# Patient Record
Sex: FEMALE | Race: BLACK OR AFRICAN AMERICAN | Employment: FULL TIME | URBAN - METROPOLITAN AREA
[De-identification: names, ages, dates, MRNs, and addresses within clinical notes are randomized per-mention and may not be internally consistent; named-entity substitution may affect disease eponyms.]

---

## 2020-03-12 ENCOUNTER — APPOINTMENT (OUTPATIENT)
Dept: CT IMAGING | Age: 35
End: 2020-03-12
Attending: EMERGENCY MEDICINE
Payer: COMMERCIAL

## 2020-03-12 ENCOUNTER — APPOINTMENT (OUTPATIENT)
Dept: GENERAL RADIOLOGY | Age: 35
End: 2020-03-12
Attending: EMERGENCY MEDICINE
Payer: COMMERCIAL

## 2020-03-12 ENCOUNTER — APPOINTMENT (OUTPATIENT)
Dept: ULTRASOUND IMAGING | Age: 35
End: 2020-03-12
Attending: EMERGENCY MEDICINE
Payer: COMMERCIAL

## 2020-03-12 ENCOUNTER — HOSPITAL ENCOUNTER (EMERGENCY)
Age: 35
Discharge: HOME | End: 2020-03-12
Attending: EMERGENCY MEDICINE
Payer: COMMERCIAL

## 2020-03-12 VITALS
WEIGHT: 190.92 LBS | DIASTOLIC BLOOD PRESSURE: 58 MMHG | HEART RATE: 80 BPM | BODY MASS INDEX: 32.77 KG/M2 | SYSTOLIC BLOOD PRESSURE: 107 MMHG | OXYGEN SATURATION: 100 % | TEMPERATURE: 98.9 F | RESPIRATION RATE: 16 BRPM

## 2020-03-12 DIAGNOSIS — K80.20 GALLSTONES: ICD-10-CM

## 2020-03-12 DIAGNOSIS — R74.8 ELEVATED LIVER ENZYMES: Primary | ICD-10-CM

## 2020-03-12 DIAGNOSIS — R10.11 RUQ PAIN: ICD-10-CM

## 2020-03-12 LAB
ALBUMIN SERPL-MCNC: 3.3 G/DL (ref 3.5–5)
ALBUMIN/GLOB SERPL: 0.9 {RATIO} (ref 1.1–2.2)
ALP SERPL-CCNC: 143 U/L (ref 45–117)
ALT SERPL-CCNC: 264 U/L (ref 12–78)
ANION GAP SERPL CALC-SCNC: 8 MMOL/L (ref 5–15)
APPEARANCE UR: ABNORMAL
AST SERPL-CCNC: 364 U/L (ref 15–37)
ATRIAL RATE: 69 BPM
BACTERIA URNS QL MICRO: ABNORMAL /HPF
BASOPHILS # BLD: 0.1 K/UL (ref 0–0.1)
BASOPHILS NFR BLD: 1 % (ref 0–1)
BILIRUB SERPL-MCNC: 1.1 MG/DL (ref 0.2–1)
BILIRUB UR QL: NEGATIVE
BUN SERPL-MCNC: 12 MG/DL (ref 6–20)
BUN/CREAT SERPL: 17 (ref 12–20)
CALCIUM SERPL-MCNC: 9 MG/DL (ref 8.5–10.1)
CALCULATED P AXIS, ECG09: 35 DEGREES
CALCULATED R AXIS, ECG10: 44 DEGREES
CALCULATED T AXIS, ECG11: 24 DEGREES
CHLORIDE SERPL-SCNC: 102 MMOL/L (ref 97–108)
CO2 SERPL-SCNC: 28 MMOL/L (ref 21–32)
COLOR UR: ABNORMAL
CREAT SERPL-MCNC: 0.69 MG/DL (ref 0.55–1.02)
DIAGNOSIS, 93000: NORMAL
DIFFERENTIAL METHOD BLD: ABNORMAL
EOSINOPHIL # BLD: 0.3 K/UL (ref 0–0.4)
EOSINOPHIL NFR BLD: 5 % (ref 0–7)
EPITH CASTS URNS QL MICRO: ABNORMAL /LPF
ERYTHROCYTE [DISTWIDTH] IN BLOOD BY AUTOMATED COUNT: 14.9 % (ref 11.5–14.5)
GLOBULIN SER CALC-MCNC: 3.7 G/DL (ref 2–4)
GLUCOSE SERPL-MCNC: 92 MG/DL (ref 65–100)
GLUCOSE UR STRIP.AUTO-MCNC: NEGATIVE MG/DL
HCG UR QL: NEGATIVE
HCT VFR BLD AUTO: 37.7 % (ref 35–47)
HGB BLD-MCNC: 12.3 G/DL (ref 11.5–16)
HGB UR QL STRIP: NEGATIVE
IMM GRANULOCYTES # BLD AUTO: 0 K/UL (ref 0–0.04)
IMM GRANULOCYTES NFR BLD AUTO: 0 % (ref 0–0.5)
KETONES UR QL STRIP.AUTO: NEGATIVE MG/DL
LEUKOCYTE ESTERASE UR QL STRIP.AUTO: NEGATIVE
LIPASE SERPL-CCNC: 108 U/L (ref 73–393)
LYMPHOCYTES # BLD: 1.6 K/UL (ref 0.8–3.5)
LYMPHOCYTES NFR BLD: 24 % (ref 12–49)
MCH RBC QN AUTO: 28.8 PG (ref 26–34)
MCHC RBC AUTO-ENTMCNC: 32.6 G/DL (ref 30–36.5)
MCV RBC AUTO: 88.3 FL (ref 80–99)
MONOCYTES # BLD: 0.6 K/UL (ref 0–1)
MONOCYTES NFR BLD: 9 % (ref 5–13)
NEUTS SEG # BLD: 4.3 K/UL (ref 1.8–8)
NEUTS SEG NFR BLD: 61 % (ref 32–75)
NITRITE UR QL STRIP.AUTO: NEGATIVE
NRBC # BLD: 0 K/UL (ref 0–0.01)
NRBC BLD-RTO: 0 PER 100 WBC
P-R INTERVAL, ECG05: 170 MS
PH UR STRIP: 7 [PH] (ref 5–8)
PLATELET # BLD AUTO: 354 K/UL (ref 150–400)
PMV BLD AUTO: 9.6 FL (ref 8.9–12.9)
POTASSIUM SERPL-SCNC: 4.4 MMOL/L (ref 3.5–5.1)
PROT SERPL-MCNC: 7 G/DL (ref 6.4–8.2)
PROT UR STRIP-MCNC: NEGATIVE MG/DL
Q-T INTERVAL, ECG07: 394 MS
QRS DURATION, ECG06: 82 MS
QTC CALCULATION (BEZET), ECG08: 422 MS
RBC # BLD AUTO: 4.27 M/UL (ref 3.8–5.2)
RBC #/AREA URNS HPF: ABNORMAL /HPF (ref 0–5)
SODIUM SERPL-SCNC: 138 MMOL/L (ref 136–145)
SP GR UR REFRACTOMETRY: 1.01 (ref 1–1.03)
TROPONIN I SERPL-MCNC: <0.05 NG/ML
UR CULT HOLD, URHOLD: NORMAL
UROBILINOGEN UR QL STRIP.AUTO: 0.2 EU/DL (ref 0.2–1)
VENTRICULAR RATE, ECG03: 69 BPM
WBC # BLD AUTO: 6.9 K/UL (ref 3.6–11)
WBC URNS QL MICRO: ABNORMAL /HPF (ref 0–4)

## 2020-03-12 PROCEDURE — 83690 ASSAY OF LIPASE: CPT

## 2020-03-12 PROCEDURE — 99283 EMERGENCY DEPT VISIT LOW MDM: CPT

## 2020-03-12 PROCEDURE — 74177 CT ABD & PELVIS W/CONTRAST: CPT

## 2020-03-12 PROCEDURE — 85025 COMPLETE CBC W/AUTO DIFF WBC: CPT

## 2020-03-12 PROCEDURE — 74011250636 HC RX REV CODE- 250/636: Performed by: EMERGENCY MEDICINE

## 2020-03-12 PROCEDURE — 84484 ASSAY OF TROPONIN QUANT: CPT

## 2020-03-12 PROCEDURE — 76705 ECHO EXAM OF ABDOMEN: CPT

## 2020-03-12 PROCEDURE — 81025 URINE PREGNANCY TEST: CPT

## 2020-03-12 PROCEDURE — 93005 ELECTROCARDIOGRAM TRACING: CPT

## 2020-03-12 PROCEDURE — 96375 TX/PRO/DX INJ NEW DRUG ADDON: CPT

## 2020-03-12 PROCEDURE — 36415 COLL VENOUS BLD VENIPUNCTURE: CPT

## 2020-03-12 PROCEDURE — 74011636320 HC RX REV CODE- 636/320: Performed by: EMERGENCY MEDICINE

## 2020-03-12 PROCEDURE — 80053 COMPREHEN METABOLIC PANEL: CPT

## 2020-03-12 PROCEDURE — 71046 X-RAY EXAM CHEST 2 VIEWS: CPT

## 2020-03-12 PROCEDURE — 81001 URINALYSIS AUTO W/SCOPE: CPT

## 2020-03-12 PROCEDURE — 96374 THER/PROPH/DIAG INJ IV PUSH: CPT

## 2020-03-12 RX ORDER — OMEPRAZOLE 20 MG/1
20 TABLET, DELAYED RELEASE ORAL DAILY
Qty: 30 TAB | Refills: 0 | Status: SHIPPED | OUTPATIENT
Start: 2020-03-12 | End: 2021-04-10

## 2020-03-12 RX ORDER — MULTIVITAMIN WITH IRON
1 TABLET ORAL DAILY
COMMUNITY

## 2020-03-12 RX ORDER — CETIRIZINE HYDROCHLORIDE 10 MG/1
CAPSULE, LIQUID FILLED ORAL
COMMUNITY
End: 2021-04-06

## 2020-03-12 RX ORDER — FEXOFENADINE HCL AND PSEUDOEPHEDRINE HCI 60; 120 MG/1; MG/1
1 TABLET, EXTENDED RELEASE ORAL EVERY 12 HOURS
COMMUNITY
End: 2021-04-06

## 2020-03-12 RX ORDER — MORPHINE SULFATE 2 MG/ML
4 INJECTION, SOLUTION INTRAMUSCULAR; INTRAVENOUS
Status: COMPLETED | OUTPATIENT
Start: 2020-03-12 | End: 2020-03-12

## 2020-03-12 RX ORDER — ONDANSETRON 2 MG/ML
4 INJECTION INTRAMUSCULAR; INTRAVENOUS
Status: COMPLETED | OUTPATIENT
Start: 2020-03-12 | End: 2020-03-12

## 2020-03-12 RX ORDER — DICYCLOMINE HYDROCHLORIDE 10 MG/1
20 CAPSULE ORAL
Qty: 20 CAP | Refills: 0 | Status: SHIPPED | OUTPATIENT
Start: 2020-03-12 | End: 2021-04-10

## 2020-03-12 RX ADMIN — IOPAMIDOL 100 ML: 755 INJECTION, SOLUTION INTRAVENOUS at 18:02

## 2020-03-12 RX ADMIN — MORPHINE SULFATE 4 MG: 2 INJECTION, SOLUTION INTRAMUSCULAR; INTRAVENOUS at 18:14

## 2020-03-12 RX ADMIN — IOHEXOL: 240 INJECTION, SOLUTION INTRATHECAL; INTRAVASCULAR; INTRAVENOUS; ORAL at 15:43

## 2020-03-12 RX ADMIN — SODIUM CHLORIDE 1000 ML: 900 INJECTION, SOLUTION INTRAVENOUS at 18:13

## 2020-03-12 RX ADMIN — ONDANSETRON 4 MG: 2 INJECTION INTRAMUSCULAR; INTRAVENOUS at 18:12

## 2020-03-12 NOTE — LETTER
21 [facility] EMERGENCY DEPT 
[address] [facility] [place] [address] 
[phone] Work/School Note Date: 3/12/2020 To Whom It May concern: Ivy Capps was seen and treated today in the emergency room by the following provider(s): 
Attending Provider: Jose Luis Garcia MD. Ivy Capps may return to work on 3/13/20. Sincerely, Joya Lee MD

## 2020-03-12 NOTE — ED PROVIDER NOTES
26-year-old female with a history of multiple abdominal surgeries, Nancy-en-Y presents with upper abdominal pain that started yesterday. She states that it felt like gas. She had nausea and vomiting associated. She denies any fevers or chills. Last bowel movement was today and was normal.  She tried Gas-X without relief. She rates her pain 6 out of 10 in the upper abdomen. It is worse with sitting up. Abdominal Pain           Past Medical History:   Diagnosis Date    Asthma        Past Surgical History:   Procedure Laterality Date    ABDOMEN SURGERY PROC UNLISTED      Lap band Oct 2008    HX GASTRIC BYPASS  2016    HX HERNIA REPAIR  2018    incarcerated hernia    HX ROTATOR CUFF REPAIR Right 2017         History reviewed. No pertinent family history.     Social History     Socioeconomic History    Marital status:      Spouse name: Not on file    Number of children: Not on file    Years of education: Not on file    Highest education level: Not on file   Occupational History    Not on file   Social Needs    Financial resource strain: Not on file    Food insecurity     Worry: Not on file     Inability: Not on file    Transportation needs     Medical: Not on file     Non-medical: Not on file   Tobacco Use    Smoking status: Never Smoker    Smokeless tobacco: Current User   Substance and Sexual Activity    Alcohol use: No    Drug use: Yes     Types: Marijuana     Comment: vape and smoke marijuana    Sexual activity: Not on file   Lifestyle    Physical activity     Days per week: Not on file     Minutes per session: Not on file    Stress: Not on file   Relationships    Social connections     Talks on phone: Not on file     Gets together: Not on file     Attends Rastafarian service: Not on file     Active member of club or organization: Not on file     Attends meetings of clubs or organizations: Not on file     Relationship status: Not on file    Intimate partner violence     Fear of current or ex partner: Not on file     Emotionally abused: Not on file     Physically abused: Not on file     Forced sexual activity: Not on file   Other Topics Concern    Not on file   Social History Narrative    Not on file         ALLERGIES: Patient has no known allergies. Review of Systems   Gastrointestinal: Positive for abdominal pain. All other systems reviewed and are negative. Vitals:    03/12/20 1507 03/12/20 1630   BP: 121/60 107/58   Pulse: 80    Resp: 16    Temp: 98.9 °F (37.2 °C)    SpO2: 100% 100%   Weight: 86.6 kg (190 lb 14.7 oz)             Physical Exam  Vitals signs and nursing note reviewed. Constitutional:       General: She is not in acute distress. HENT:      Head: Normocephalic and atraumatic. Mouth/Throat:      Mouth: Mucous membranes are moist.   Eyes:      General: No scleral icterus. Conjunctiva/sclera: Conjunctivae normal.      Pupils: Pupils are equal, round, and reactive to light. Neck:      Musculoskeletal: Neck supple. Trachea: No tracheal deviation. Cardiovascular:      Rate and Rhythm: Normal rate and regular rhythm. Pulmonary:      Effort: Pulmonary effort is normal. No respiratory distress. Breath sounds: No wheezing or rales. Abdominal:      General: There is no distension. Palpations: Abdomen is soft. Tenderness: There is abdominal tenderness in the right upper quadrant, epigastric area and left upper quadrant. Genitourinary:     Comments: deferred  Musculoskeletal:         General: No deformity. Skin:     General: Skin is warm and dry. Neurological:      General: No focal deficit present. Mental Status: She is alert. Psychiatric:         Mood and Affect: Mood normal.          MDM       Procedures    7:15 PM CT showed perianal fluid collection no other explanation for patient's pain. Did a rectal exam could not appreciate an abscess or drainable fluid collection.   Will refer to colorectal surgery for further management of this. Given her LFTs elevated, will obtain right upper quadrant ultrasound to ensure normal gallbladder. Signed out to Dr. Pagan Comment at 7:15 PM.    Anna Lucero.  Julieta Garcia MD

## 2020-03-12 NOTE — ED NOTES
Pt resting on stretcher in no obvious distress. Tolerating contrast well. Pt ambulatory to restroom.

## 2020-03-12 NOTE — ED NOTES
Pt resting on stretcher, reports pain level is increasing. Medicated per order. Pt on monitor x2. Vitals updated.  Call bell in reach

## 2020-03-12 NOTE — ED TRIAGE NOTES
Yesterday morning pt began having left and right sided upper abdominal pain that also radiates to center of upper abdomen. Pt tried gas x with no relief. Last BM was today. Pt has history of multiple abdominal surgeries.

## 2020-03-13 NOTE — DISCHARGE INSTRUCTIONS
Patient Education        Biliary Colic: Care Instructions  Your Care Instructions    Biliary (say \"BILL-ee-air-ee\") colic is belly pain caused by gallbladder problems. It is usually caused by a gallstone moving through or blocking the common bile duct or cystic duct. Gallstones are stones that form in the gallbladder. They are made of cholesterol and other substances. The gallbladder is a small sac located just under the liver. It stores bile released by the liver. Bile helps you digest fats. Gallstones also can form in the common bile duct or cystic duct. These ducts carry bile from the gallbladder and the liver to the small intestine. Gallstones may be as small as a grain of sand or as large as a golf ball. Gallstones that cause severe symptoms usually are treated with surgery to remove the gallbladder. If the first attack of biliary colic is mild, it is often safe to wait until you have had another attack before you think about having surgery. The doctor has checked you carefully, but problems can develop later. If you notice any problems or new symptoms, get medical treatment right away. Follow-up care is a key part of your treatment and safety. Be sure to make and go to all appointments, and call your doctor if you are having problems. It's also a good idea to know your test results and keep a list of the medicines you take. How can you care for yourself at home? · Take pain medicines exactly as directed. ? If the doctor gave you a prescription medicine for pain, take it as prescribed. ? If you are not taking a prescription pain medicine, ask your doctor if you can take an over-the-counter medicine. Read and follow all instructions on the label. · Avoid foods that cause symptoms, especially fatty foods. These can cause biliary colic. · You may need more tests to look at your gallbladder. When should you call for help? Call your doctor now or seek immediate medical care if:    · You have a fever.   · You have new belly pain, or your pain gets worse.     · There is a new or increasing yellow tint to your skin or the whites of your eyes.     · Your urine is dark yellow-brown, or your stools are light-colored or white.     · You cannot keep down fluids.    Watch closely for changes in your health, and be sure to contact your doctor if:    · You do not get better as expected.     · You are not getting better after 1 day (24 hours). Where can you learn more? Go to http://phil-roderick.info/  Enter [id number] in the search box to learn more about \"Biliary Colic: Care Instructions. \"  Current as of: August 11, 2019Content Version: 12.4  © 1316-6987 [employer], Incorporated. Care instructions adapted under license by [employer] (which disclaims liability or warranty for this information). If you have questions about a medical condition or this instruction, always ask your healthcare professional. [address] any warranty or liability for your use of this information.

## 2020-03-13 NOTE — ED NOTES
715 PM  Change of shift. Care of patient taken over from Dr Nidia Saini; H&P reviewed, bedside handoff complete. Awaiting US. Meli Dong MD    Progress Note:  Results, treatment, and follow up plan have been discussed with patient. Questions were answered.    Meli Dong MD  8:23 PM

## 2021-04-02 ENCOUNTER — HOSPITAL ENCOUNTER (INPATIENT)
Age: 36
LOS: 4 days | Discharge: HOME | DRG: 751 | End: 2021-04-06
Attending: EMERGENCY MEDICINE | Admitting: PSYCHIATRY & NEUROLOGY
Payer: COMMERCIAL

## 2021-04-02 DIAGNOSIS — R45.1 RESTLESSNESS AND AGITATION: Primary | ICD-10-CM

## 2021-04-02 DIAGNOSIS — F23 BRIEF PSYCHOTIC DISORDER (HCC): ICD-10-CM

## 2021-04-02 DIAGNOSIS — R45.851 SUICIDAL IDEATION: ICD-10-CM

## 2021-04-02 DIAGNOSIS — F30.9 MANIA (HCC): ICD-10-CM

## 2021-04-02 PROBLEM — F29 PSYCHOSIS (HCC): Status: ACTIVE | Noted: 2021-04-02

## 2021-04-02 LAB
AMPHET UR QL SCN: NEGATIVE
ANION GAP SERPL CALC-SCNC: 13 MMOL/L (ref 5–15)
APPEARANCE UR: CLEAR
BACTERIA URNS QL MICRO: NEGATIVE /HPF
BARBITURATES UR QL SCN: NEGATIVE
BASOPHILS # BLD: 0.1 K/UL (ref 0–0.1)
BASOPHILS NFR BLD: 1 % (ref 0–1)
BENZODIAZ UR QL: NEGATIVE
BILIRUB UR QL: NEGATIVE
BUN SERPL-MCNC: 24 MG/DL (ref 6–20)
BUN/CREAT SERPL: 25 (ref 12–20)
CALCIUM SERPL-MCNC: 9.6 MG/DL (ref 8.5–10.1)
CANNABINOIDS UR QL SCN: POSITIVE
CHLORIDE SERPL-SCNC: 100 MMOL/L (ref 97–108)
CO2 SERPL-SCNC: 24 MMOL/L (ref 21–32)
COCAINE UR QL SCN: NEGATIVE
COLOR UR: ABNORMAL
CREAT SERPL-MCNC: 0.96 MG/DL (ref 0.55–1.02)
DIFFERENTIAL METHOD BLD: ABNORMAL
DRUG SCRN COMMENT,DRGCM: ABNORMAL
EOSINOPHIL # BLD: 0.1 K/UL (ref 0–0.4)
EOSINOPHIL NFR BLD: 1 % (ref 0–7)
EPITH CASTS URNS QL MICRO: ABNORMAL /LPF
ERYTHROCYTE [DISTWIDTH] IN BLOOD BY AUTOMATED COUNT: 16.2 % (ref 11.5–14.5)
ETHANOL SERPL-MCNC: <10 MG/DL
FLUAV RNA SPEC QL NAA+PROBE: NOT DETECTED
FLUBV RNA SPEC QL NAA+PROBE: NOT DETECTED
GLUCOSE SERPL-MCNC: 116 MG/DL (ref 65–100)
GLUCOSE UR STRIP.AUTO-MCNC: NEGATIVE MG/DL
HCG UR QL: NEGATIVE
HCT VFR BLD AUTO: 34.7 % (ref 35–47)
HGB BLD-MCNC: 11.4 G/DL (ref 11.5–16)
HGB UR QL STRIP: NEGATIVE
IMM GRANULOCYTES # BLD AUTO: 0 K/UL (ref 0–0.04)
IMM GRANULOCYTES NFR BLD AUTO: 1 % (ref 0–0.5)
KETONES UR QL STRIP.AUTO: 15 MG/DL
LEUKOCYTE ESTERASE UR QL STRIP.AUTO: NEGATIVE
LYMPHOCYTES # BLD: 1 K/UL (ref 0.8–3.5)
LYMPHOCYTES NFR BLD: 11 % (ref 12–49)
MCH RBC QN AUTO: 26.8 PG (ref 26–34)
MCHC RBC AUTO-ENTMCNC: 32.9 G/DL (ref 30–36.5)
MCV RBC AUTO: 81.6 FL (ref 80–99)
METHADONE UR QL: NEGATIVE
MONOCYTES # BLD: 0.7 K/UL (ref 0–1)
MONOCYTES NFR BLD: 8 % (ref 5–13)
NEUTS SEG # BLD: 6.9 K/UL (ref 1.8–8)
NEUTS SEG NFR BLD: 78 % (ref 32–75)
NITRITE UR QL STRIP.AUTO: NEGATIVE
NRBC # BLD: 0 K/UL (ref 0–0.01)
NRBC BLD-RTO: 0 PER 100 WBC
OPIATES UR QL: NEGATIVE
PCP UR QL: NEGATIVE
PH UR STRIP: 6 [PH] (ref 5–8)
PLATELET # BLD AUTO: 372 K/UL (ref 150–400)
PMV BLD AUTO: 9.2 FL (ref 8.9–12.9)
POTASSIUM SERPL-SCNC: 3.8 MMOL/L (ref 3.5–5.1)
PROT UR STRIP-MCNC: NEGATIVE MG/DL
RBC # BLD AUTO: 4.25 M/UL (ref 3.8–5.2)
RBC #/AREA URNS HPF: ABNORMAL /HPF (ref 0–5)
SARS-COV-2, COV2: NOT DETECTED
SODIUM SERPL-SCNC: 137 MMOL/L (ref 136–145)
SP GR UR REFRACTOMETRY: 1.01 (ref 1–1.03)
UA: UC IF INDICATED,UAUC: ABNORMAL
UROBILINOGEN UR QL STRIP.AUTO: 0.2 EU/DL (ref 0.2–1)
WBC # BLD AUTO: 8.7 K/UL (ref 3.6–11)
WBC URNS QL MICRO: ABNORMAL /HPF (ref 0–4)

## 2021-04-02 PROCEDURE — 87636 SARSCOV2 & INF A&B AMP PRB: CPT

## 2021-04-02 PROCEDURE — 99284 EMERGENCY DEPT VISIT MOD MDM: CPT

## 2021-04-02 PROCEDURE — 74011250637 HC RX REV CODE- 250/637: Performed by: NURSE PRACTITIONER

## 2021-04-02 PROCEDURE — 80048 BASIC METABOLIC PNL TOTAL CA: CPT

## 2021-04-02 PROCEDURE — 36415 COLL VENOUS BLD VENIPUNCTURE: CPT

## 2021-04-02 PROCEDURE — 81025 URINE PREGNANCY TEST: CPT

## 2021-04-02 PROCEDURE — 65220000003 HC RM SEMIPRIVATE PSYCH

## 2021-04-02 PROCEDURE — 82077 ASSAY SPEC XCP UR&BREATH IA: CPT

## 2021-04-02 PROCEDURE — 96372 THER/PROPH/DIAG INJ SC/IM: CPT

## 2021-04-02 PROCEDURE — 81001 URINALYSIS AUTO W/SCOPE: CPT

## 2021-04-02 PROCEDURE — 85025 COMPLETE CBC W/AUTO DIFF WBC: CPT

## 2021-04-02 PROCEDURE — 80307 DRUG TEST PRSMV CHEM ANLYZR: CPT

## 2021-04-02 PROCEDURE — 74011250636 HC RX REV CODE- 250/636: Performed by: EMERGENCY MEDICINE

## 2021-04-02 PROCEDURE — 74011250636 HC RX REV CODE- 250/636

## 2021-04-02 RX ORDER — HALOPERIDOL 5 MG/ML
5 INJECTION INTRAMUSCULAR
Status: DISCONTINUED | OUTPATIENT
Start: 2021-04-02 | End: 2021-04-06 | Stop reason: HOSPADM

## 2021-04-02 RX ORDER — HYDROXYZINE 25 MG/1
50 TABLET, FILM COATED ORAL
Status: DISCONTINUED | OUTPATIENT
Start: 2021-04-02 | End: 2021-04-06 | Stop reason: HOSPADM

## 2021-04-02 RX ORDER — LORAZEPAM 2 MG/ML
1 INJECTION INTRAMUSCULAR
Status: DISCONTINUED | OUTPATIENT
Start: 2021-04-02 | End: 2021-04-06 | Stop reason: HOSPADM

## 2021-04-02 RX ORDER — VENLAFAXINE HYDROCHLORIDE 37.5 MG/1
37.5 CAPSULE, EXTENDED RELEASE ORAL DAILY
COMMUNITY
Start: 2021-02-25 | End: 2021-04-06

## 2021-04-02 RX ORDER — OLANZAPINE 5 MG/1
5 TABLET ORAL
Status: DISCONTINUED | OUTPATIENT
Start: 2021-04-02 | End: 2021-04-06 | Stop reason: HOSPADM

## 2021-04-02 RX ORDER — TRAZODONE HYDROCHLORIDE 50 MG/1
50 TABLET ORAL
Status: DISCONTINUED | OUTPATIENT
Start: 2021-04-02 | End: 2021-04-06 | Stop reason: HOSPADM

## 2021-04-02 RX ORDER — ADHESIVE BANDAGE
30 BANDAGE TOPICAL DAILY PRN
Status: DISCONTINUED | OUTPATIENT
Start: 2021-04-02 | End: 2021-04-06 | Stop reason: HOSPADM

## 2021-04-02 RX ORDER — DIPHENHYDRAMINE HYDROCHLORIDE 50 MG/ML
50 INJECTION, SOLUTION INTRAMUSCULAR; INTRAVENOUS
Status: DISCONTINUED | OUTPATIENT
Start: 2021-04-02 | End: 2021-04-06 | Stop reason: HOSPADM

## 2021-04-02 RX ORDER — ACETAMINOPHEN 325 MG/1
650 TABLET ORAL
Status: DISCONTINUED | OUTPATIENT
Start: 2021-04-02 | End: 2021-04-06 | Stop reason: HOSPADM

## 2021-04-02 RX ORDER — BENZTROPINE MESYLATE 1 MG/1
1 TABLET ORAL
Status: DISCONTINUED | OUTPATIENT
Start: 2021-04-02 | End: 2021-04-06 | Stop reason: HOSPADM

## 2021-04-02 RX ORDER — LORAZEPAM 2 MG/ML
INJECTION INTRAMUSCULAR
Status: COMPLETED
Start: 2021-04-02 | End: 2021-04-02

## 2021-04-02 RX ORDER — LORAZEPAM 2 MG/ML
2 INJECTION INTRAMUSCULAR
Status: COMPLETED | OUTPATIENT
Start: 2021-04-02 | End: 2021-04-02

## 2021-04-02 RX ORDER — ZIPRASIDONE MESYLATE 20 MG/ML
20 INJECTION, POWDER, LYOPHILIZED, FOR SOLUTION INTRAMUSCULAR
Status: COMPLETED | OUTPATIENT
Start: 2021-04-02 | End: 2021-04-02

## 2021-04-02 RX ADMIN — TRAZODONE HYDROCHLORIDE 50 MG: 50 TABLET ORAL at 23:07

## 2021-04-02 RX ADMIN — ZIPRASIDONE MESYLATE 20 MG: 20 INJECTION, POWDER, LYOPHILIZED, FOR SOLUTION INTRAMUSCULAR at 11:00

## 2021-04-02 RX ADMIN — LORAZEPAM 2 MG: 2 INJECTION INTRAMUSCULAR at 11:00

## 2021-04-02 RX ADMIN — LORAZEPAM 2 MG: 2 INJECTION INTRAMUSCULAR; INTRAVENOUS at 11:00

## 2021-04-02 RX ADMIN — HYDROXYZINE HYDROCHLORIDE 50 MG: 25 TABLET, FILM COATED ORAL at 23:07
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
## 2021-04-02 NOTE — BH NOTES
Admission assessment complete, pt is drowsy due to Im injections in ED. Pt states that she is not having any SI/HI, AVH. She has a flight of ideas and cannot express what happened prior to admission. Pt confirms anxiety and depression. Body and belonging search completed. Pt has abrasions noted to bilateral hands, fingers, and wrists. There are no other issues to note at this time. Monitoring will continue.

## 2021-04-02 NOTE — ED NOTES
TRANSFER - OUT REPORT:    Verbal report given to GenetRN(name) on Jamey Ron  being transferred to [address] [place] [address]) for routine progression of care       Report consisted of patients Situation, Background, Assessment and   Recommendations(SBAR). Information from the following report(s) SBAR, Kardex, ED Summary, [facility] and Recent Results was reviewed with the receiving nurse. Lines:       Opportunity for questions and clarification was provided.       Patient transported with:  Dorien Rinne

## 2021-04-02 NOTE — PROGRESS NOTES
[facility] Admission Pharmacy Medication Reconciliation -IN PROGRESS    Information obtained from:  [employer] ([place] ); [address] [facility] [phone] [address] [place] [address] Road: yes    Comments/recommendations:    1) IN PROGRESS: This pharmacist unable to conduct patient interview at this time. Interview will be attempted at a later time. 2) Medication added:  Filled 2/25/21 at [facility]: Venlafaxine ER 37.5 mg po DAILY #90 capsules    3) The [place] Prescription Monitoring Program () was accessed to determine fill history of any controlled medications:  12/18/20:  Hydromorphone 2 mg tablets #12 tabs one tablet q4h prn pain        1[employer] pharmacy benefit data reflects medications filled and processed through the patient's insurance, however                this data does NOT capture whether the medication was picked up or is currently being taken by the patient. Past Medical History/Disease States:  Past Medical History:   Diagnosis Date    Asthma          Patient allergies: Allergies as of 04/02/2021    (No Known Allergies)         Prior to Admission Medications   Prescriptions       Cetirizine (ZyrTEC) 10 mg cap       Sig: Take  by mouth. cholecalciferol, vitamin D3, (VITAMIN D3 PO)       Sig: Take  by mouth. dicyclomine (BentyL) 10 mg capsule       Sig: Take 2 Caps by mouth three (3) times daily as needed for Abdominal Cramps. doxylamine succinate (UNISOM) 25 mg tablet       Sig: Take 25 mg by mouth nightly as needed for Sleep. fexofenadine-pseudoephedrine (Allegra-D 12 Hour)  mg Tb12       Sig: Take 1 Tab by mouth every twelve (12) hours. multivitamin with iron tablet       Sig: Take 1 Tab by mouth daily. omeprazole (PRILOSEC OTC) 20 mg tablet       Sig: Take 1 Tab by mouth daily. venlafaxine-SR (EFFEXOR-XR) 37.5 mg capsule       Sig: Take 37.5 mg by mouth daily.           Thank you,  Chucky Fernandez, [facility]

## 2021-04-02 NOTE — ED NOTES
Patient sitting on floor and became tearful and states that she was at a interview today and ran out because it triggered her PTSD from her childhood. Patient then proceeds to shut down and not continue to speak.

## 2021-04-02 NOTE — ED NOTES
Patient placed on stretcher and proceeds to slide onto floor. Patient continues to yell and scream. Patient medicated with 2mg Ativan and 20mg of Geodon. Attempted to place patient back on stretcher and patient resisted. Patient continues to sit on floor. . [facility] and [place] remains outside room.

## 2021-04-02 NOTE — ED NOTES
Patient resting in bed comfortably and in no acute distress.  Lights dimmed and warm blanket provided for comfort
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
## 2021-04-02 NOTE — ED NOTES
Patient not cooperative and not able to change patient into gown.  [employer] officer checked patient for contraband

## 2021-04-02 NOTE — ED NOTES
Patient ambulated to bedside commode. This RN at bedside to assist. Patient appears drowsy but cooperative with obtaining urine sample and COVID swab. [employer] remains outside the bedroom.

## 2021-04-02 NOTE — ED NOTES
Patient placed in stretcher. Handcuffs removed by [employer] officer whom remains at bedside . Patient agreeable with having labs drawn. Patient hands noted to have dried blood and patient noted to have skin abrasion to bilateral hands but declined to have nurse to clean them and take a look. Labs obtained and patient appears to be drowsy from medication.

## 2021-04-03 PROCEDURE — 99223 1ST HOSP IP/OBS HIGH 75: CPT | Performed by: PSYCHIATRY & NEUROLOGY

## 2021-04-03 PROCEDURE — 74011250636 HC RX REV CODE- 250/636: Performed by: NURSE PRACTITIONER

## 2021-04-03 PROCEDURE — 65220000003 HC RM SEMIPRIVATE PSYCH

## 2021-04-03 RX ORDER — CETIRIZINE HCL 10 MG
10 TABLET ORAL DAILY
Status: DISCONTINUED | OUTPATIENT
Start: 2021-04-04 | End: 2021-04-03 | Stop reason: CLARIF

## 2021-04-03 RX ORDER — DICYCLOMINE HYDROCHLORIDE 10 MG/1
20 CAPSULE ORAL
Status: DISCONTINUED | OUTPATIENT
Start: 2021-04-03 | End: 2021-04-06 | Stop reason: HOSPADM

## 2021-04-03 RX ORDER — VENLAFAXINE HYDROCHLORIDE 75 MG/1
75 CAPSULE, EXTENDED RELEASE ORAL DAILY
Status: DISCONTINUED | OUTPATIENT
Start: 2021-04-04 | End: 2021-04-03

## 2021-04-03 RX ORDER — PANTOPRAZOLE SODIUM 40 MG/1
40 TABLET, DELAYED RELEASE ORAL
Status: DISCONTINUED | OUTPATIENT
Start: 2021-04-04 | End: 2021-04-06 | Stop reason: HOSPADM

## 2021-04-03 RX ORDER — LORATADINE 10 MG/1
10 TABLET ORAL DAILY
Status: DISCONTINUED | OUTPATIENT
Start: 2021-04-04 | End: 2021-04-06 | Stop reason: HOSPADM

## 2021-04-03 RX ADMIN — HALOPERIDOL LACTATE 5 MG: 5 INJECTION, SOLUTION INTRAMUSCULAR at 03:34

## 2021-04-03 RX ADMIN — LORAZEPAM 1 MG: 2 INJECTION INTRAMUSCULAR; INTRAVENOUS at 03:33

## 2021-04-03 NOTE — PROGRESS NOTES
Problem: Altered Thought Process (Adult/Pediatric)  Goal: *STG: Participates in treatment plan  Outcome: Progressing Towards Goal  Goal: *STG: Remains safe in hospital  Outcome: Progressing Towards Goal  Goal: *STG: Seeks staff when feelings of anxiety and fear arise  Outcome: Progressing Towards Goal  Goal: *STG: Complies with medication therapy  Outcome: Progressing Towards Goal  Goal: *STG: Attends activities and groups  Outcome: Progressing Towards Goal  Goal: *STG: Decreased delusional thinking  Outcome: Progressing Towards Goal     Problem: Depressed Mood (Adult/Pediatric)  Goal: *STG: Complies with medication therapy  Outcome: Progressing Towards Goal

## 2021-04-03 NOTE — PROGRESS NOTES
The patient was sleeping when staff arrived. She was assessed by this nurse and presented to be cordial, only to wake up later with a totally different demeanor. She was given 50mg atarax and 50mg trazodone and 2307 for sleep and anxiety. The patient woke up around 0230 and was very angry and hostile toward staff. She was complaining about the rate of service, the type of food available, and how she would have to wait while her nurse was watching another patient while that nurse was on lunch break. She was fixated on a small cut on her hand that staff had offered her a band aid for several times. This nurse had attempted to clean the cut during the PM med pass, but the patient was too tired and asked if it could be done in the morning. She continued to escalate in her hostility toward the staff. After several verbal interventions and attempts to calm the patient down, IM medication was given. At 1464 the patient agreed to take 5mg of haldol and 1mg of ativan in her right deltoid her severe agitation and hostility. Staff continued to monitor the patient the rest of the night and have safety maintained. She slept a total of 10.5 hours, voiced no HI/SI/AVH, or pain. No other concerns.

## 2021-04-03 NOTE — PROGRESS NOTES
Problem: Discharge Planning  Goal: *Discharge to safe environment  Outcome: Progressing Towards Goal  Note: Patient identifies home as a safe environment. Patient will return home upon discharge. Goal: *Knowledge of medication management  Outcome: Progressing Towards Goal  Note: Patient verbalizes understanding of medication regimen. Patient is taking medications as prescribed. Goal: *Knowledge of discharge instructions  Outcome: Not Progressing Towards Goal  Note: Patient does not verbalize understanding of goals for treatment or safe discharge.

## 2021-04-03 NOTE — PROGRESS NOTES
Problem: Altered Thought Process (Adult/Pediatric)  Goal: *STG: Participates in treatment plan  4/3/2021 0018 by Onesimo Triplett RN  Outcome: Progressing Towards Goal  4/3/2021 0015 by Onesimo Triplett RN  Outcome: Progressing Towards Goal  Goal: *STG: Remains safe in hospital  4/3/2021 0018 by Onesimo Triplett RN  Outcome: Progressing Towards Goal  4/3/2021 0015 by Onesimo Triplett RN  Outcome: Progressing Towards Goal  Goal: *STG: Seeks staff when feelings of anxiety and fear arise  4/3/2021 0018 by Onesimo Triplett RN  Outcome: Progressing Towards Goal  4/3/2021 0015 by Onesimo Triplett RN  Outcome: Progressing Towards Goal  Goal: *STG: Complies with medication therapy  Outcome: Progressing Towards Goal  Goal: *STG: Attends activities and groups  Outcome: Progressing Towards Goal  Goal: *STG: Decreased delusional thinking  4/3/2021 0018 by Onesimo Triplett RN  Outcome: Progressing Towards Goal  4/3/2021 0015 by Onesimo Triplett RN  Outcome: Progressing Towards Goal  Goal: *STG: Decreased hallucinations  Outcome: Progressing Towards Goal  Goal: *STG: Absence of lethality  Outcome: Progressing Towards Goal     Problem: Psychosis  Goal: *STG: Decreased hallucinations  Outcome: Progressing Towards Goal  Goal: *STG: Decreased delusional thinking  Outcome: Progressing Towards Goal  Goal: *STG: Remains safe in hospital  Outcome: Progressing Towards Goal
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
## 2021-04-03 NOTE — PROGRESS NOTES
Patient cooperative, compliant with vital signs. Patient denies anxiety and depression, denies SI, HI, AH, VH. Although patient denies symptoms, patient withdrawn, appears preoccupied, guarded with others. Patient to dayroom for meals, occasionally interactive with nursing staff, otherwise isolative to self. Patient declined prn medications. Not interactive with peers.

## 2021-04-04 PROBLEM — F30.9 MANIA (HCC): Status: ACTIVE | Noted: 2021-04-04

## 2021-04-04 PROCEDURE — 74011250637 HC RX REV CODE- 250/637: Performed by: PSYCHIATRY & NEUROLOGY

## 2021-04-04 PROCEDURE — 99232 SBSQ HOSP IP/OBS MODERATE 35: CPT | Performed by: PSYCHIATRY & NEUROLOGY

## 2021-04-04 PROCEDURE — 65220000003 HC RM SEMIPRIVATE PSYCH

## 2021-04-04 PROCEDURE — 74011250637 HC RX REV CODE- 250/637: Performed by: NURSE PRACTITIONER

## 2021-04-04 RX ORDER — QUETIAPINE 300 MG/1
300 TABLET, FILM COATED, EXTENDED RELEASE ORAL
Status: DISCONTINUED | OUTPATIENT
Start: 2021-04-04 | End: 2021-04-06 | Stop reason: HOSPADM

## 2021-04-04 RX ORDER — AMOXICILLIN 250 MG
1 CAPSULE ORAL
Status: DISCONTINUED | OUTPATIENT
Start: 2021-04-04 | End: 2021-04-06 | Stop reason: HOSPADM

## 2021-04-04 RX ADMIN — DOCUSATE SODIUM 50 MG AND SENNOSIDES 8.6 MG 1 TABLET: 8.6; 5 TABLET, FILM COATED ORAL at 21:53

## 2021-04-04 RX ADMIN — QUETIAPINE FUMARATE 300 MG: 300 TABLET, EXTENDED RELEASE ORAL at 21:53

## 2021-04-04 RX ADMIN — TRAZODONE HYDROCHLORIDE 50 MG: 50 TABLET ORAL at 21:53

## 2021-04-04 RX ADMIN — PANTOPRAZOLE SODIUM 40 MG: 40 TABLET, DELAYED RELEASE ORAL at 06:06

## 2021-04-04 RX ADMIN — OLANZAPINE 5 MG: 5 TABLET, FILM COATED ORAL at 21:53

## 2021-04-04 RX ADMIN — LORATADINE 10 MG: 10 TABLET ORAL at 09:01

## 2021-04-04 RX ADMIN — MULTIPLE VITAMINS W/ MINERALS TAB 1 TABLET: TAB at 09:01

## 2021-04-04 NOTE — PROGRESS NOTES
Behavioral Services  Medicare Certification Upon Admission    I certify that this patient's inpatient psychiatric hospital admission is medically necessary for:    [x] (1) Treatment which could reasonably be expected to improve this patient's condition,       [x] (2) Or for diagnostic study;     AND     [x](2) The inpatient psychiatric services are provided while the individual is under the care of a physician and are included in the individualized plan of care.     Estimated length of stay/service TBD     Plan for post-hospital care home vs CSU    Electronically signed by Damari Mujica MD on 4/4/2021 at 12:25 PM

## 2021-04-04 NOTE — BH NOTES
GROUP THERAPY PROGRESS NOTE    Patient is participating in Coping Skills group. Group time: 50 minutes     Personal goal for participation: Learn coping skills to improve mental health, reduce stress and work through uncomfortable emotions    Goal orientation: Personal    Group therapy participation: active    Therapeutic interventions reviewed and discussed: Group discussion on ways patients are coping with mental health needs, stress and uncomfortable emotions. Discussion regarding alternative positive coping skills using each letter of the alphabet, resulting in patients having a list of 26+ positive coping skills to access. Impression of participation: Pt presented with euthymic mood, bright affect, clear speech, logical thought process, calm and cooperative, interacted appropriately with others. Pt had proper insight and judgement, processed how unhealthy coping skills she has used in the past and the consequences of these coping skills. Pt identified numerous positive coping skills including dancing, spirituality, exercise, art. Pt processed desire to use healthy coping skills to work through depression and anxiety.     JARRET Rand

## 2021-04-04 NOTE — BH NOTES
PSYCHIATRIC PROGRESS NOTE         Patient Name  Gladys Parr   Date of Birth 1985   [facility] [id number]   Medical Record Number  [id number]      Age  [address][age] y.o. PCP Malia Schmitt MD   Admit date:  4/2/2021    Room Number  308/02  @ [facility]   Date of Service  4/4/2021         E & M PROGRESS NOTE:         HISTORY       CC:  \"psychosis\"  HISTORY OF PRESENT ILLNESS/INTERVAL HISTORY:  (reviewed/updated 4/4/2021). per initial evaluation: The patient, Gladys Parr, is a [address] y.o. BLACK/ female with a past psychiatric history significant for PTSD, who presents at this time with complaints of (and/or evidence of) the following emotional symptoms: agitation and psychotic behavior. Additional symptomatology include robert. The above symptoms have been present for 2+ weeks. These symptoms are of moderate to high severity. These symptoms are constant in nature. The patient's condition has been precipitated by psychosocial stressors. Patient's condition made worse by continued illicit drug use as well as treatment noncompliance. UDS: +THC; BAL=0.     Per ED and nursing report, the patient was observed driving erratically, on the median, then walking in and out of traffic; when approached for assistance by police became belligerent. She was brought to the ED under suspicion of intoxication but noted to have no alcohol in her system. Once in the emergency room, she was agitated and required IM PRNs; on the unit the patient required additional IMs for agitation including Haldol and Ativan. Patient seen s/p PRN administration. She acknowledges the above history, but is evasive regarding the circumstances leading to the admission other than stating her PTSD was triggered by recent life stressors including resigning from her job and being fired from a position prior to this.  She is discharge focused and oppositional with treatment team.     The patient is an evasive historian. The patient does not corroborate the above narrative. The patient contracts for safety on the unit and gives consent for the team to contact collateral. The patient is not amenable to initiating treatment while on the unit. 4/04 - no acute overnight events. Patient has been visible, paranoid, slept 6 hours overnight; she has not required any agitation PRNs since arrival on the unit yesterday and is making her needs known. Patient is discharge focused, voices understanding of the TDO procedure. She is reluctant to start medications but agrees to Seroquel XR for symptom control. Patient gives several name and numbers of collateral friends (Sarah Camejo [phone], godmother Jessica Gu [phone], friend Zofia Aldridge [phone]) and provides consent for the team to contact them. SIDE EFFECTS: (reviewed/updated 4/4/2021)  None reported or admitted to. ALLERGIES:(reviewed/updated 4/4/2021)  No Known Allergies   MEDICATIONS PRIOR TO ADMISSION:(reviewed/updated 4/4/2021)  Medications Prior to Admission   Medication Sig    venlafaxine-SR (EFFEXOR-XR) 37.5 mg capsule Take 37.5 mg by mouth daily.  multivitamin with iron tablet Take 1 Tab by mouth daily.  cholecalciferol, vitamin D3, (VITAMIN D3 PO) Take  by mouth.  Cetirizine (ZyrTEC) 10 mg cap Take  by mouth.  fexofenadine-pseudoephedrine (Allegra-D 12 Hour)  mg Tb12 Take 1 Tab by mouth every twelve (12) hours.  doxylamine succinate (UNISOM) 25 mg tablet Take 25 mg by mouth nightly as needed for Sleep.  dicyclomine (BentyL) 10 mg capsule Take 2 Caps by mouth three (3) times daily as needed for Abdominal Cramps.  omeprazole (PRILOSEC OTC) 20 mg tablet Take 1 Tab by mouth daily. PAST MEDICAL HISTORY: Past medical history from the initial psychiatric evaluation has been reviewed (reviewed/updated 4/4/2021) with no additional updates (I asked patient and no additional past medical history provided).  Past Medical History: Diagnosis Date    Asthma      Past Surgical History:   Procedure Laterality Date    ABDOMEN SURGERY PROC UNLISTED      Lap band Oct 2008    HX GASTRIC BYPASS  2016    HX HERNIA REPAIR  2018    incarcerated hernia    HX ROTATOR CUFF REPAIR Right 2017      SOCIAL HISTORY: Social history from the initial psychiatric evaluation has been reviewed (reviewed/updated 4/4/2021) with no additional updates (I asked patient and no additional social history provided).  Social History     Socioeconomic History    Marital status:      Spouse name: Not on file    Number of children: Not on file    Years of education: Not on file    Highest education level: Not on file   Occupational History    Not on file   Social Needs    Financial resource strain: Not on file    Food insecurity     Worry: Not on file     Inability: Not on file    Transportation needs     Medical: Not on file     Non-medical: Not on file   Tobacco Use    Smoking status: Never Smoker    Smokeless tobacco: Current User   Substance and Sexual Activity    Alcohol use: No    Drug use: Yes     Types: Marijuana     Comment: vape and smoke marijuana    Sexual activity: Not on file   Lifestyle    Physical activity     Days per week: Not on file     Minutes per session: Not on file    Stress: Not on file   Relationships    Social connections     Talks on phone: Not on file     Gets together: Not on file     Attends [employer] service: Not on file     Active member of club or organization: Not on file     Attends meetings of clubs or organizations: Not on file     Relationship status: Not on file    Intimate partner violence     Fear of current or ex partner: Not on file     Emotionally abused: Not on file     Physically abused: Not on file     Forced sexual activity: Not on file   Other Topics Concern    Not on file   Social History Narrative    Not on file      FAMILY HISTORY: Family history from the initial psychiatric evaluation has been reviewed (reviewed/updated 4/4/2021) with no additional updates (I asked patient and no additional family history provided). No family history on file. REVIEW OF SYSTEMS: (reviewed/updated 4/4/2021)  Appetite:no change from normal   Sleep: improved   All other Review of Systems: Negative except per HPI         [facility] (MSE):    MSE FINDINGS ARE WITHIN NORMAL LIMITS (WNL) UNLESS OTHERWISE STATED BELOW. ( ALL OF THE BELOW CATEGORIES OF THE MSE HAVE BEEN REVIEWED (reviewed 4/4/2021) AND UPDATED AS DEEMED APPROPRIATE )  General Presentation age appropriate, cooperative and guarded   Orientation oriented to time, place and person   Vital Signs  See below (reviewed 4/4/2021); Vital Signs (BP, Pulse, & Temp) are within normal limits if not listed below.    Gait and Station Stable/steady, no ataxia   Musculoskeletal System No extrapyramidal symptoms (EPS); no abnormal muscular movements or Tardive Dyskinesia (TD); muscle strength and tone are within normal limits   Language No aphasia or dysarthria   Speech:  normal volume and pressured   Thought Processes illogical; normal rate of thoughts; fair abstract reasoning/computation   Thought Associations tangential   Thought Content paranoid delusions and grandiose delusions   Suicidal Ideations contracts for safety   Homicidal Ideations none   Mood:  irritable   Affect:  full range and odd demeanor   Memory recent  intact   Memory remote:  intact   Concentration/Attention:  intact   Fund of Knowledge average   Insight:  limited   Reliability fair   Judgment:  poor          VITALS:     Patient Vitals for the past 24 hrs:   Temp Pulse Resp BP SpO2   04/04/21 0738 98.1 °F (36.7 °C) (!) 108 18 102/72 100 %   04/03/21 2000 98.2 °F (36.8 °C) 88 18 108/88 100 %     Wt Readings from Last 3 Encounters:   04/02/21 97.5 kg (215 lb)   03/12/20 86.6 kg (190 lb 14.7 oz)   09/06/13 112.5 kg (248 lb)     Temp Readings from Last 3 Encounters: 04/04/21 98.1 °F (36.7 °C)   03/12/20 98.9 °F (37.2 °C)   01/01/12 98.1 °F (36.7 °C)     BP Readings from Last 3 Encounters:   04/04/21 102/72   03/12/20 107/58   09/06/13 113/50     Pulse Readings from Last 3 Encounters:   04/04/21 (!) 108   03/12/20 80   01/01/12 76            DATA     LABORATORY DATA:(reviewed/updated 4/4/2021)  No results found for this or any previous visit (from the past 24 hour(s)). No results found for: VALF2, VALAC, VALP, VALPR, DS6, CRBAM, CRBAMP, CARB2, XCRBAM  No results found for: LITHM   RADIOLOGY REPORTS:(reviewed/updated 4/4/2021)  No results found.        MEDICATIONS     ALL MEDICATIONS:   Current Facility-Administered Medications   Medication Dose Route Frequency    pantoprazole (PROTONIX) tablet 40 mg  40 mg Oral ACB    multivitamin, tx-iron-ca-min (THERA-M w/ IRON) tablet 1 Tab  1 Tab Oral DAILY    dicyclomine (BENTYL) capsule 20 mg  20 mg Oral TID PRN    loratadine (CLARITIN) tablet 10 mg  10 mg Oral DAILY    OLANZapine (ZyPREXA) tablet 5 mg  5 mg Oral Q6H PRN    haloperidol lactate (HALDOL) injection 5 mg  5 mg IntraMUSCular Q6H PRN    benztropine (COGENTIN) tablet 1 mg  1 mg Oral BID PRN    diphenhydrAMINE (BENADRYL) injection 50 mg  50 mg IntraMUSCular BID PRN    hydrOXYzine HCL (ATARAX) tablet 50 mg  50 mg Oral TID PRN    LORazepam (ATIVAN) injection 1 mg  1 mg IntraMUSCular Q4H PRN    traZODone (DESYREL) tablet 50 mg  50 mg Oral QHS PRN    acetaminophen (TYLENOL) tablet 650 mg  650 mg Oral Q4H PRN    magnesium hydroxide (MILK OF MAGNESIA) 400 mg/5 mL oral suspension 30 mL  30 mL Oral DAILY PRN      SCHEDULED MEDICATIONS:   Current Facility-Administered Medications   Medication Dose Route Frequency    pantoprazole (PROTONIX) tablet 40 mg  40 mg Oral ACB    multivitamin, tx-iron-ca-min (THERA-M w/ IRON) tablet 1 Tab  1 Tab Oral DAILY    loratadine (CLARITIN) tablet 10 mg  10 mg Oral DAILY          ASSESSMENT & PLAN     DIAGNOSES REQUIRING ACTIVE TREATMENT AND MONITORING: (reviewed/updated 4/4/2021)  Patient Active Hospital Problem List:   Psychosis [facility]) (4/2/2021)    Assessment: patient with recent episode of agitation and ongoing lability in the setting of external stressors and cannabis use. Unclear etiology of present episode, suspect bipolar robert but there are confounding elements to presentation. Will observe off substances for now, treat symptomatically. DDx: bipolar robert, schizoaffective disorder, borderline personality disorder    Plan:  - DISCONTINUE Effexor due to potential robert  - START Seroquel  mg QHS for paranoid ideation  - Consider mood stabilizer  - Observe off substances  - IGM therapy as tolerated  - Expand database / obtain collateral  - Dispo planning ([facility] vs home)    In summary, Roberto Williamson, is a 28 y.o.  female who presents with a severe exacerbation of the principal diagnosis of Psychosis ([address]    Patient's condition is not improving. Patient requires continued inpatient hospitalization for further stabilization, safety monitoring and medication management. I will continue to coordinate the provision of individual, milieu, occupational, group, and substance abuse therapies to address target symptoms/diagnoses as deemed appropriate for the individual patient. A coordinated, multidisplinary treatment team round was conducted with the patient (this team consists of the nurse, psychiatric unit pharmacist,  and writer). Complete current electronic health record for patient has been reviewed today including consultant notes, ancillary staff notes, nurses and psychiatric tech notes. Suicide risk assessment completed and patient deemed to be of low risk for suicide at this time. The following regarding medications was addressed during rounds with patient:   the risks and benefits of the proposed medication. The patient was given the opportunity to ask questions.  Informed consent given to the use of the above medications. Will continue to adjust psychiatric and non-psychiatric medications (see above \"medication\" section and orders section for details) as deemed appropriate & based upon diagnoses and response to treatment. I will continue to order blood tests/labs and diagnostic tests as deemed appropriate and review results as they become available (see orders for details and above listed lab/test results). I will order psychiatric records from previous [facility] hospitals to further elucidate the nature of patient's psychopathology and review once available. I will gather additional collateral information from friends, family and o/p treatment team to further elucidate the nature of patient's psychopathology and baselline level of psychiatric functioning. I certify that this patient's inpatient psychiatric hospital services furnished since the previous certification were, and continue to be, required for treatment that could reasonably be expected to improve the patient's condition, or for diagnostic study, and that the patient continues to need, on a daily basis, active treatment furnished directly by or requiring the supervision of inpatient psychiatric facility personnel. In addition, the hospital records show that services furnished were intensive treatment services, admission or related services, or equivalent services.     EXPECTED DISCHARGE DATE/DAY: TBD     DISPOSITION: Home       Signed By:   Randall Montero MD  4/4/2021

## 2021-04-04 NOTE — PROGRESS NOTES
Problem: Altered Thought Process (Adult/Pediatric)  Goal: *STG: Participates in treatment plan  Outcome: Progressing Towards Goal  Goal: *STG: Remains safe in hospital  Outcome: Progressing Towards Goal  Goal: *STG: Seeks staff when feelings of anxiety and fear arise  Outcome: Progressing Towards Goal  Goal: *STG: Complies with medication therapy  Outcome: Progressing Towards Goal  Goal: *STG: Attends activities and groups  Outcome: Progressing Towards Goal  Goal: *STG: Decreased delusional thinking  Outcome: Progressing Towards Goal  Goal: *STG: Decreased hallucinations  Outcome: Progressing Towards Goal  Goal: *STG: Absence of lethality  Outcome: Progressing Towards Goal  Goal: *STG: Demonstrates ability to understand and use improved judgment in daily activities and relationships  Outcome: Progressing Towards Goal  Goal: *LTG: Returns to baseline functioning  Outcome: Progressing Towards Goal  Goal: Interventions  Outcome: Progressing Towards Goal     Problem: Depressed Mood (Adult/Pediatric)  Goal: *STG: Participates in treatment plan  Outcome: Progressing Towards Goal  Goal: *STG: Participates in 1:1 therapy sessions  Outcome: Progressing Towards Goal  Goal: *STG: Verbalizes anger, guilt, and other feelings in a constructive manor  Outcome: Progressing Towards Goal  Goal: *STG: Attends activities and groups  Outcome: Progressing Towards Goal  Goal: *STG: Demonstrates reduction in symptoms and increase in insight into coping skills/future focused  Outcome: Progressing Towards Goal  Goal: *STG: Remains safe in hospital  Outcome: Progressing Towards Goal  Goal: *STG: Complies with medication therapy  Outcome: Progressing Towards Goal  Goal: *LTG: Returns to previous level of functioning and participates with after care plan  Outcome: Progressing Towards Goal  Goal: *LTG: Understands illness and can identify signs of relapse  Outcome: Progressing Towards Goal  Goal: Interventions  Outcome: Progressing Towards Goal     Patient alert and oriented to person and place. She has been interacting with peers and staff. Patient denied depression, anxiety, SI/HI, and AVH at this time. Patient is guarded and displays paranoia. No ordered PM medications. She complained of bilateral wrist pain rated 5/10 and denied the need for PRN pain relievers. No PRNs given, vital signs stable and Q15 minute checks continue to maintain safety. RN will continue to monitor.

## 2021-04-04 NOTE — PROGRESS NOTES
Problem: Depressed Mood (Adult/Pediatric) Goal: *STG: Remains safe in hospital 
Outcome: Progressing Towards Goal

## 2021-04-04 NOTE — PROGRESS NOTES
0930 Pt received in room. Pt denies si/hi/ah/vh. Anxiety=0/10 depression=0/10. Pt is Aox4. Pt is calm and cooperative. Med and meal compliant. Pain level of 0/10. Will continue to monitor for any changes.

## 2021-04-05 PROCEDURE — 65220000003 HC RM SEMIPRIVATE PSYCH

## 2021-04-05 PROCEDURE — 74011250637 HC RX REV CODE- 250/637: Performed by: PSYCHIATRY & NEUROLOGY

## 2021-04-05 RX ADMIN — DOCUSATE SODIUM 50 MG AND SENNOSIDES 8.6 MG 1 TABLET: 8.6; 5 TABLET, FILM COATED ORAL at 21:10

## 2021-04-05 RX ADMIN — QUETIAPINE FUMARATE 300 MG: 300 TABLET, EXTENDED RELEASE ORAL at 21:11

## 2021-04-05 RX ADMIN — MULTIPLE VITAMINS W/ MINERALS TAB 1 TABLET: TAB at 09:29

## 2021-04-05 RX ADMIN — LORATADINE 10 MG: 10 TABLET ORAL at 09:29

## 2021-04-05 RX ADMIN — PANTOPRAZOLE SODIUM 40 MG: 40 TABLET, DELAYED RELEASE ORAL at 06:39

## 2021-04-05 NOTE — BH NOTES
[employer] conducted a virtual TDO hearing this morning. The ending result of hearing, patient Committed.

## 2021-04-05 NOTE — PROGRESS NOTES
Problem: Altered Thought Process (Adult/Pediatric)  Goal: *STG: Participates in treatment plan  Outcome: Progressing Towards Goal  Goal: *STG: Remains safe in hospital  Outcome: Progressing Towards Goal  Goal: *STG: Seeks staff when feelings of anxiety and fear arise  Outcome: Progressing Towards Goal  Goal: *STG: Complies with medication therapy  Outcome: Progressing Towards Goal  Goal: *STG: Attends activities and groups  Outcome: Progressing Towards Goal  Goal: *STG: Decreased delusional thinking  Outcome: Not Progressing Towards Goal  Goal: *STG: Decreased hallucinations  Outcome: Not Progressing Towards Goal  Goal: *STG: Absence of lethality  Outcome: Not Progressing Towards Goal  Goal: *STG: Demonstrates ability to understand and use improved judgment in daily activities and relationships  Outcome: Not Progressing Towards Goal  Goal: *LTG: Returns to baseline functioning  Outcome: Not Progressing Towards Goal

## 2021-04-05 NOTE — PROGRESS NOTES
The patient was in the day room socializing with peers and playing cards. She was laughing and enjoying some of the community structure of the unit. The patient was given trazodone 50mg and zyprexa 5mg at 2153 with her regularly scheduled medications. She denied all SI/HI/AVH and pain, but still had thought content where she blamed others for being here and had paranoid delusions. The patient was able to sleep 8 hours and have safety maintained. No other issues or concerns.

## 2021-04-05 NOTE — GROUP NOTE
LILA  GROUP DOCUMENTATION INDIVIDUAL Group Therapy Note Date: 4/5/2021 Group Start Time: 0900 Group End Time: 1000 Group Topic: Topic Group [facility] 3 ACUTE BEHAV [facility], [address] GROUP DOCUMENTATION GROUP Group Therapy Note Attendees: 7 Attendance: Attended Patient's Goal:  To participate in mental health journey game Interventions/techniques: Supported-choices in recovery Follows Directions:  
 
Interactions: minimal 
 
Mental Status: Preoccupied Behavior/appearance: Needed prompting Goals Achieved: Additional Notes:  Pt declined active participation-left session early Ozzie Carrera

## 2021-04-05 NOTE — BH NOTES
PSYCHIATRIC PROGRESS NOTE         Patient Name  Alyssia Haywood   Date of Birth 1985   [facility] [id number]   Medical Record Number  [id number]      Age  28 y.o. PCP Samara Marvin MD   Admit date:  4/2/2021    Room Number  308/02  @ [facility]   Date of Service  4/5/2021         E & M PROGRESS NOTE:         HISTORY       CC:  \"psychosis\"  HISTORY OF PRESENT ILLNESS/INTERVAL HISTORY:  (reviewed/updated 4/5/2021). per initial evaluation: The patient, Alyssia Haywood, is a 28 y.o. BLACK/ female with a past psychiatric history significant for PTSD, who presents at this time with complaints of (and/or evidence of) the following emotional symptoms: agitation and psychotic behavior. Additional symptomatology include robert. The above symptoms have been present for 2+ weeks. These symptoms are of moderate to high severity. These symptoms are constant in nature. The patient's condition has been precipitated by psychosocial stressors. Patient's condition made worse by continued illicit drug use as well as treatment noncompliance. UDS: +THC; BAL=0.     Per ED and nursing report, the patient was observed driving erratically, on the median, then walking in and out of traffic; when approached for assistance by police became belligerent. She was brought to the ED under suspicion of intoxication but noted to have no alcohol in her system. Once in the emergency room, she was agitated and required IM PRNs; on the unit the patient required additional IMs for agitation including Haldol and Ativan. Patient seen s/p PRN administration. She acknowledges the above history, but is evasive regarding the circumstances leading to the admission other than stating her PTSD was triggered by recent life stressors including resigning from her job and being fired from a position prior to this.  She is discharge focused and oppositional with treatment team.     The patient is an evasive historian. The patient does not corroborate the above narrative. The patient contracts for safety on the unit and gives consent for the team to contact collateral. The patient is not amenable to initiating treatment while on the unit. 4/04 - no acute overnight events. Patient has been visible, paranoid, slept 6 hours overnight; she has not required any agitation PRNs since arrival on the unit yesterday and is making her needs known. Patient is discharge focused, voices understanding of the TDO procedure. She is reluctant to start medications but agrees to Seroquel XR for symptom control. Patient gives several name and numbers of collateral friends (Gayl Staff [phone], godmother Chente Moreno [phone], friend Murrell Gitelman [phone]) and provides consent for the team to contact them. 4/05 - Roberto Williamson reports feeling cooped up in this facility and would like to have some sun and fresh air. Notes medications cause brain fog for her. Notes her situation was triggered but was able to speak with family and friends over the weekend. Moods are fair. Denies SI/HI/AH/VH. No aggression or violence. Appropriately interactive and aware. Tolerating medications fairly. Using coping strategies. Eating and sleeping fairly. Discussed her gastric bypass supplementation needs. SIDE EFFECTS: (reviewed/updated 4/5/2021)  None reported or admitted to. ALLERGIES:(reviewed/updated 4/5/2021)  No Known Allergies   MEDICATIONS PRIOR TO ADMISSION:(reviewed/updated 4/5/2021)  Medications Prior to Admission   Medication Sig    venlafaxine-SR (EFFEXOR-XR) 37.5 mg capsule Take 37.5 mg by mouth daily.  multivitamin with iron tablet Take 1 Tab by mouth daily.  cholecalciferol, vitamin D3, (VITAMIN D3 PO) Take  by mouth.  Cetirizine (ZyrTEC) 10 mg cap Take  by mouth.  fexofenadine-pseudoephedrine (Allegra-D 12 Hour)  mg Tb12 Take 1 Tab by mouth every twelve (12) hours.     doxylamine succinate (UNISOM) 25 mg tablet Take 25 mg by mouth nightly as needed for Sleep.  dicyclomine (BentyL) 10 mg capsule Take 2 Caps by mouth three (3) times daily as needed for Abdominal Cramps.  omeprazole (PRILOSEC OTC) 20 mg tablet Take 1 Tab by mouth daily. PAST MEDICAL HISTORY: Past medical history from the initial psychiatric evaluation has been reviewed (reviewed/updated 4/5/2021) with no additional updates (I asked patient and no additional past medical history provided). Past Medical History:   Diagnosis Date    Asthma      Past Surgical History:   Procedure Laterality Date    ABDOMEN SURGERY PROC UNLISTED      Lap band Oct 2008    HX GASTRIC BYPASS  2016    HX HERNIA REPAIR  2018    incarcerated hernia    HX ROTATOR CUFF REPAIR Right 2017      SOCIAL HISTORY: Social history from the initial psychiatric evaluation has been reviewed (reviewed/updated 4/5/2021) with no additional updates (I asked patient and no additional social history provided).    Social History     Socioeconomic History    Marital status:      Spouse name: Not on file    Number of children: Not on file    Years of education: Not on file    Highest education level: Not on file   Occupational History    Not on file   Social Needs    Financial resource strain: Not on file    Food insecurity     Worry: Not on file     Inability: Not on file    Transportation needs     Medical: Not on file     Non-medical: Not on file   Tobacco Use    Smoking status: Never Smoker    Smokeless tobacco: Current User   Substance and Sexual Activity    Alcohol use: No    Drug use: Yes     Types: Marijuana     Comment: vape and smoke marijuana    Sexual activity: Not on file   Lifestyle    Physical activity     Days per week: Not on file     Minutes per session: Not on file    Stress: Not on file   Relationships    Social connections     Talks on phone: Not on file     Gets together: Not on file     Attends Hoahaoism service: Not on file Active member of club or organization: Not on file     Attends meetings of clubs or organizations: Not on file     Relationship status: Not on file    Intimate partner violence     Fear of current or ex partner: Not on file     Emotionally abused: Not on file     Physically abused: Not on file     Forced sexual activity: Not on file   Other Topics Concern    Not on file   Social History Narrative    Not on file      FAMILY HISTORY: Family history from the initial psychiatric evaluation has been reviewed (reviewed/updated 4/5/2021) with no additional updates (I asked patient and no additional family history provided). No family history on file. REVIEW OF SYSTEMS: (reviewed/updated 4/5/2021)  Appetite:no change from normal   Sleep: improved   All other Review of Systems: Negative except per HPI         [facility] (MSE):    MSE FINDINGS ARE WITHIN NORMAL LIMITS (WNL) UNLESS OTHERWISE STATED BELOW. ( ALL OF THE BELOW CATEGORIES OF THE MSE HAVE BEEN REVIEWED (reviewed 4/5/2021) AND UPDATED AS DEEMED APPROPRIATE )  General Presentation age appropriate, cooperative and guarded   Orientation oriented to time, place and person   Vital Signs  See below (reviewed 4/5/2021); Vital Signs (BP, Pulse, & Temp) are within normal limits if not listed below.    Gait and Station Stable/steady, no ataxia   Musculoskeletal System No extrapyramidal symptoms (EPS); no abnormal muscular movements or Tardive Dyskinesia (TD); muscle strength and tone are within normal limits   Language No aphasia or dysarthria   Speech:  normal volume and pressured   Thought Processes illogical; normal rate of thoughts; fair abstract reasoning/computation   Thought Associations tangential   Thought Content paranoid delusions and grandiose delusions   Suicidal Ideations contracts for safety   Homicidal Ideations none   Mood:  irritable   Affect:  full range and odd demeanor   Memory recent  intact   Memory remote:  intact   Concentration/Attention:  intact   Fund of Knowledge average   Insight:  limited   Reliability fair   Judgment:  poor          VITALS:     Patient Vitals for the past 24 hrs:   Temp Pulse Resp BP SpO2   04/05/21 0808 98.5 °F (36.9 °C) 83 18 (!) 97/58 100 %   04/04/21 2000 98.4 °F (36.9 °C) 100 20 (!) 112/56 100 %     Wt Readings from Last 3 Encounters:   04/02/21 97.5 kg (215 lb)   03/12/20 86.6 kg (190 lb 14.7 oz)   09/06/13 112.5 kg (248 lb)     Temp Readings from Last 3 Encounters:   04/05/21 98.5 °F (36.9 °C)   03/12/20 98.9 °F (37.2 °C)   01/01/12 98.1 °F (36.7 °C)     BP Readings from Last 3 Encounters:   04/05/21 (!) 97/58   03/12/20 107/58   09/06/13 113/50     Pulse Readings from Last 3 Encounters:   04/05/21 83   03/12/20 80   01/01/12 76            DATA     LABORATORY DATA:(reviewed/updated 4/5/2021)  No results found for this or any previous visit (from the past 24 hour(s)). No results found for: VALF2, VALAC, VALP, VALPR, DS6, CRBAM, CRBAMP, CARB2, XCRBAM  No results found for: LITHM   RADIOLOGY REPORTS:(reviewed/updated 4/5/2021)  No results found.        MEDICATIONS     ALL MEDICATIONS:   Current Facility-Administered Medications   Medication Dose Route Frequency    Height needed  1 Each Other ONCE    QUEtiapine SR (SEROquel XR) tablet 300 mg  300 mg Oral QHS    senna-docusate (PERICOLACE) 8.6-50 mg per tablet 1 Tab  1 Tab Oral QHS    pantoprazole (PROTONIX) tablet 40 mg  40 mg Oral ACB    multivitamin, tx-iron-ca-min (THERA-M w/ IRON) tablet 1 Tab  1 Tab Oral DAILY    dicyclomine (BENTYL) capsule 20 mg  20 mg Oral TID PRN    loratadine (CLARITIN) tablet 10 mg  10 mg Oral DAILY    OLANZapine (ZyPREXA) tablet 5 mg  5 mg Oral Q6H PRN    haloperidol lactate (HALDOL) injection 5 mg  5 mg IntraMUSCular Q6H PRN    benztropine (COGENTIN) tablet 1 mg  1 mg Oral BID PRN    diphenhydrAMINE (BENADRYL) injection 50 mg  50 mg IntraMUSCular BID PRN    hydrOXYzine HCL (ATARAX) tablet 50 mg  50 mg Oral TID PRN    LORazepam (ATIVAN) injection 1 mg  1 mg IntraMUSCular Q4H PRN    traZODone (DESYREL) tablet 50 mg  50 mg Oral QHS PRN    acetaminophen (TYLENOL) tablet 650 mg  650 mg Oral Q4H PRN    magnesium hydroxide (MILK OF MAGNESIA) 400 mg/5 mL oral suspension 30 mL  30 mL Oral DAILY PRN      SCHEDULED MEDICATIONS:   Current Facility-Administered Medications   Medication Dose Route Frequency    Height needed  1 Each Other ONCE    QUEtiapine SR (SEROquel XR) tablet 300 mg  300 mg Oral QHS    senna-docusate (PERICOLACE) 8.6-50 mg per tablet 1 Tab  1 Tab Oral QHS    pantoprazole (PROTONIX) tablet 40 mg  40 mg Oral ACB    multivitamin, tx-iron-ca-min (THERA-M w/ IRON) tablet 1 Tab  1 Tab Oral DAILY    loratadine (CLARITIN) tablet 10 mg  10 mg Oral DAILY          ASSESSMENT & PLAN     DIAGNOSES REQUIRING ACTIVE TREATMENT AND MONITORING: (reviewed/updated 4/5/2021)  Patient Active Hospital Problem List:   Psychosis [facility]) (4/2/2021)    Assessment: patient with recent episode of agitation and ongoing lability in the setting of external stressors and cannabis use. Unclear etiology of present episode, suspect bipolar monisha but there are confounding elements to presentation. Will observe off substances for now, treat symptomatically. DDx: bipolar monisha, schizoaffective disorder, borderline personality disorder    Plan:  - DISCONTINUE Effexor due to potential monisha  - START Seroquel  mg QHS for paranoid ideation  - Dietary Consult (Gastric bypass  - Consider mood stabilizer  - Observe off substances  - IGM therapy as tolerated  - Expand database / obtain collateral  - Dispo planning ([facility] vs home)    In summary, Cristo Robbins, is a 28 y.o.  female who presents with a severe exacerbation of the principal diagnosis of Monisha ([facility] [address]    Patient's condition is not improving.     Patient requires continued inpatient hospitalization for further stabilization, safety monitoring and medication management. I will continue to coordinate the provision of individual, milieu, occupational, group, and substance abuse therapies to address target symptoms/diagnoses as deemed appropriate for the individual patient. A coordinated, multidisplinary treatment team round was conducted with the patient (this team consists of the nurse, psychiatric unit pharmacist,  and writer). Complete current electronic health record for patient has been reviewed today including consultant notes, ancillary staff notes, nurses and psychiatric tech notes. Suicide risk assessment completed and patient deemed to be of low risk for suicide at this time. The following regarding medications was addressed during rounds with patient:   the risks and benefits of the proposed medication. The patient was given the opportunity to ask questions. Informed consent given to the use of the above medications. Will continue to adjust psychiatric and non-psychiatric medications (see above \"medication\" section and orders section for details) as deemed appropriate & based upon diagnoses and response to treatment. I will continue to order blood tests/labs and diagnostic tests as deemed appropriate and review results as they become available (see orders for details and above listed lab/test results). I will order psychiatric records from previous [facility] hospitals to further elucidate the nature of patient's psychopathology and review once available. I will gather additional collateral information from friends, family and o/p treatment team to further elucidate the nature of patient's psychopathology and baselline level of psychiatric functioning.          I certify that this patient's inpatient psychiatric hospital services furnished since the previous certification were, and continue to be, required for treatment that could reasonably be expected to improve the patient's condition, or for diagnostic study, and that the patient continues to need, on a daily basis, active treatment furnished directly by or requiring the supervision of inpatient psychiatric facility personnel. In addition, the hospital records show that services furnished were intensive treatment services, admission or related services, or equivalent services.     EXPECTED DISCHARGE DATE/DAY: TBD     DISPOSITION: Home       Signed By:   Racheal Whitten MD  4/5/2021

## 2021-04-05 NOTE — BH NOTES
PSYCHIATRIC PROGRESS NOTE         Patient Name  Mita Ramon   Date of Birth 1985   [facility] [id number]   Medical Record Number  [id number]      Age  28 y.o. PCP Yana Maldonado MD   Admit date:  4/2/2021    Room Number  308/02  @ [facility]   Date of Service  4/5/2021         E & M PROGRESS NOTE:         HISTORY       CC:  \"psychosis\"  HISTORY OF PRESENT ILLNESS/INTERVAL HISTORY:  (reviewed/updated 4/5/2021). per initial evaluation: The patient, Mita Ramon, is a 28 y.o. BLACK/ female with a past psychiatric history significant for PTSD, who presents at this time with complaints of (and/or evidence of) the following emotional symptoms: agitation and psychotic behavior. Additional symptomatology include robert. The above symptoms have been present for 2+ weeks. These symptoms are of moderate to high severity. These symptoms are constant in nature. The patient's condition has been precipitated by psychosocial stressors. Patient's condition made worse by continued illicit drug use as well as treatment noncompliance. UDS: +THC; BAL=0.     Per ED and nursing report, the patient was observed driving erratically, on the median, then walking in and out of traffic; when approached for assistance by police became belligerent. She was brought to the ED under suspicion of intoxication but noted to have no alcohol in her system. Once in the emergency room, she was agitated and required IM PRNs; on the unit the patient required additional IMs for agitation including Haldol and Ativan. Patient seen s/p PRN administration. She acknowledges the above history, but is evasive regarding the circumstances leading to the admission other than stating her PTSD was triggered by recent life stressors including resigning from her job and being fired from a position prior to this.  She is discharge focused and oppositional with treatment team.     The patient is an evasive historian. The patient does not corroborate the above narrative. The patient contracts for safety on the unit and gives consent for the team to contact collateral. The patient is not amenable to initiating treatment while on the unit. 4/04 - no acute overnight events. Patient has been visible, paranoid, slept 6 hours overnight; she has not required any agitation PRNs since arrival on the unit yesterday and is making her needs known. Patient is discharge focused, voices understanding of the TDO procedure. She is reluctant to start medications but agrees to Seroquel XR for symptom control. Patient gives several name and numbers of collateral friends (Saundra Salter [phone], godmother Rafal Restrepo [phone], friend Jhonatan Travis [phone]) and provides consent for the team to contact them. 4/05 - Lauren Rees reports feeling cooped up in this facility and would like to have some sun and fresh air. Notes medications cause brain fog for her. Notes her situation was triggered but was able to speak with family and friends over the weekend. Moods are fair. Denies SI/HI/AH/VH. No aggression or violence. Appropriately interactive and aware. Tolerating medications fairly. Using coping strategies. Eating and sleeping fairly. Discussed her gastric bypass supplementation needs. Manipulative and demanding behaviors on unit. SIDE EFFECTS: (reviewed/updated 4/5/2021)  None reported or admitted to. ALLERGIES:(reviewed/updated 4/5/2021)  No Known Allergies   MEDICATIONS PRIOR TO ADMISSION:(reviewed/updated 4/5/2021)  Medications Prior to Admission   Medication Sig    venlafaxine-SR (EFFEXOR-XR) 37.5 mg capsule Take 37.5 mg by mouth daily.  multivitamin with iron tablet Take 1 Tab by mouth daily.  cholecalciferol, vitamin D3, (VITAMIN D3 PO) Take  by mouth.  Cetirizine (ZyrTEC) 10 mg cap Take  by mouth.     fexofenadine-pseudoephedrine (Allegra-D 12 Hour)  mg Tb12 Take 1 Tab by mouth every twelve (12) hours.  doxylamine succinate (UNISOM) 25 mg tablet Take 25 mg by mouth nightly as needed for Sleep.  dicyclomine (BentyL) 10 mg capsule Take 2 Caps by mouth three (3) times daily as needed for Abdominal Cramps.  omeprazole (PRILOSEC OTC) 20 mg tablet Take 1 Tab by mouth daily. PAST MEDICAL HISTORY: Past medical history from the initial psychiatric evaluation has been reviewed (reviewed/updated 4/5/2021) with no additional updates (I asked patient and no additional past medical history provided). Past Medical History:   Diagnosis Date    Asthma      Past Surgical History:   Procedure Laterality Date    ABDOMEN SURGERY PROC UNLISTED      Lap band Oct 2008    HX GASTRIC BYPASS  2016    HX HERNIA REPAIR  2018    incarcerated hernia    HX ROTATOR CUFF REPAIR Right 2017      SOCIAL HISTORY: Social history from the initial psychiatric evaluation has been reviewed (reviewed/updated 4/5/2021) with no additional updates (I asked patient and no additional social history provided).    Social History     Socioeconomic History    Marital status:      Spouse name: Not on file    Number of children: Not on file    Years of education: Not on file    Highest education level: Not on file   Occupational History    Not on file   Social Needs    Financial resource strain: Not on file    Food insecurity     Worry: Not on file     Inability: Not on file    Transportation needs     Medical: Not on file     Non-medical: Not on file   Tobacco Use    Smoking status: Never Smoker    Smokeless tobacco: Current User   Substance and Sexual Activity    Alcohol use: No    Drug use: Yes     Types: Marijuana     Comment: vape and smoke marijuana    Sexual activity: Not on file   Lifestyle    Physical activity     Days per week: Not on file     Minutes per session: Not on file    Stress: Not on file   Relationships    Social connections     Talks on phone: Not on file     Gets together: Not on file     Attends Mu-ism service: Not on file     Active member of club or organization: Not on file     Attends meetings of clubs or organizations: Not on file     Relationship status: Not on file    Intimate partner violence     Fear of current or ex partner: Not on file     Emotionally abused: Not on file     Physically abused: Not on file     Forced sexual activity: Not on file   Other Topics Concern    Not on file   Social History Narrative    Not on file      FAMILY HISTORY: Family history from the initial psychiatric evaluation has been reviewed (reviewed/updated 4/5/2021) with no additional updates (I asked patient and no additional family history provided). No family history on file. REVIEW OF SYSTEMS: (reviewed/updated 4/5/2021)  Appetite:no change from normal   Sleep: improved   All other Review of Systems: Negative except per HPI         [address] (MSE):    MSE FINDINGS ARE WITHIN NORMAL LIMITS (WNL) UNLESS OTHERWISE STATED BELOW. ( ALL OF THE BELOW CATEGORIES OF THE MSE HAVE BEEN REVIEWED (reviewed 4/5/2021) AND UPDATED AS DEEMED APPROPRIATE )  General Presentation age appropriate, cooperative and guarded   Orientation oriented to time, place and person   Vital Signs  See below (reviewed 4/5/2021); Vital Signs (BP, Pulse, & Temp) are within normal limits if not listed below.    Gait and Station Stable/steady, no ataxia   Musculoskeletal System No extrapyramidal symptoms (EPS); no abnormal muscular movements or Tardive Dyskinesia (TD); muscle strength and tone are within normal limits   Language No aphasia or dysarthria   Speech:  normal volume and pressured   Thought Processes illogical; normal rate of thoughts; fair abstract reasoning/computation   Thought Associations tangential   Thought Content paranoid delusions and grandiose delusions   Suicidal Ideations contracts for safety   Homicidal Ideations none   Mood:  irritable   Affect:  full range and odd demeanor   Memory recent  intact   Memory remote:  intact   Concentration/Attention:  intact   Fund of Knowledge average   Insight:  limited   Reliability fair   Judgment:  poor          VITALS:     Patient Vitals for the past 24 hrs:   Temp Pulse Resp BP SpO2   04/05/21 0808 98.5 °F (36.9 °C) 83 18 (!) 97/58 100 %   04/04/21 2000 98.4 °F (36.9 °C) 100 20 (!) 112/56 100 %     Wt Readings from Last 3 Encounters:   04/02/21 97.5 kg (215 lb)   03/12/20 86.6 kg (190 lb 14.7 oz)   09/06/13 112.5 kg (248 lb)     Temp Readings from Last 3 Encounters:   04/05/21 98.5 °F (36.9 °C)   03/12/20 98.9 °F (37.2 °C)   01/01/12 98.1 °F (36.7 °C)     BP Readings from Last 3 Encounters:   04/05/21 (!) 97/58   03/12/20 107/58   09/06/13 113/50     Pulse Readings from Last 3 Encounters:   04/05/21 83   03/12/20 80   01/01/12 76            DATA     LABORATORY DATA:(reviewed/updated 4/5/2021)  No results found for this or any previous visit (from the past 24 hour(s)). No results found for: VALF2, VALAC, VALP, VALPR, DS6, CRBAM, CRBAMP, CARB2, XCRBAM  No results found for: LITHM   RADIOLOGY REPORTS:(reviewed/updated 4/5/2021)  No results found.        MEDICATIONS     ALL MEDICATIONS:   Current Facility-Administered Medications   Medication Dose Route Frequency    Height needed  1 Each Other ONCE    QUEtiapine SR (SEROquel XR) tablet 300 mg  300 mg Oral QHS    senna-docusate (PERICOLACE) 8.6-50 mg per tablet 1 Tab  1 Tab Oral QHS    pantoprazole (PROTONIX) tablet 40 mg  40 mg Oral ACB    multivitamin, tx-iron-ca-min (THERA-M w/ IRON) tablet 1 Tab  1 Tab Oral DAILY    dicyclomine (BENTYL) capsule 20 mg  20 mg Oral TID PRN    loratadine (CLARITIN) tablet 10 mg  10 mg Oral DAILY    OLANZapine (ZyPREXA) tablet 5 mg  5 mg Oral Q6H PRN    haloperidol lactate (HALDOL) injection 5 mg  5 mg IntraMUSCular Q6H PRN    benztropine (COGENTIN) tablet 1 mg  1 mg Oral BID PRN    diphenhydrAMINE (BENADRYL) injection 50 mg  50 mg IntraMUSCular BID PRN    hydrOXYzine HCL (ATARAX) tablet 50 mg  50 mg Oral TID PRN    LORazepam (ATIVAN) injection 1 mg  1 mg IntraMUSCular Q4H PRN    traZODone (DESYREL) tablet 50 mg  50 mg Oral QHS PRN    acetaminophen (TYLENOL) tablet 650 mg  650 mg Oral Q4H PRN    magnesium hydroxide (MILK OF MAGNESIA) 400 mg/5 mL oral suspension 30 mL  30 mL Oral DAILY PRN      SCHEDULED MEDICATIONS:   Current Facility-Administered Medications   Medication Dose Route Frequency    Height needed  1 Each Other ONCE    QUEtiapine SR (SEROquel XR) tablet 300 mg  300 mg Oral QHS    senna-docusate (PERICOLACE) 8.6-50 mg per tablet 1 Tab  1 Tab Oral QHS    pantoprazole (PROTONIX) tablet 40 mg  40 mg Oral ACB    multivitamin, tx-iron-ca-min (THERA-M w/ IRON) tablet 1 Tab  1 Tab Oral DAILY    loratadine (CLARITIN) tablet 10 mg  10 mg Oral DAILY          ASSESSMENT & PLAN     DIAGNOSES REQUIRING ACTIVE TREATMENT AND MONITORING: (reviewed/updated 4/5/2021)  Patient Active Hospital Problem List:   Psychosis [facility]) (4/2/2021)    Assessment: patient with recent episode of agitation and ongoing lability in the setting of external stressors and cannabis use. Unclear etiology of present episode, suspect bipolar monisha but there are confounding elements to presentation. Will observe off substances for now, treat symptomatically. DDx: bipolar monisha, schizoaffective disorder, borderline personality disorder    Plan:  - DISCONTINUE Effexor due to potential monisha  - START Seroquel  mg QHS for paranoid ideation  - Dietary Consult (Gastric bypass  - Consider mood stabilizer  - Observe off substances  - IGM therapy as tolerated  - Expand database / obtain collateral  - Dispo planning ([facility] vs home)    In summary, Kristie Peguero, is a 28 y.o.  female who presents with a severe exacerbation of the principal diagnosis of Monisha ([address]    Patient's condition is not improving.     Patient requires continued inpatient hospitalization for further stabilization, safety monitoring and medication management. I will continue to coordinate the provision of individual, milieu, occupational, group, and substance abuse therapies to address target symptoms/diagnoses as deemed appropriate for the individual patient. A coordinated, multidisplinary treatment team round was conducted with the patient (this team consists of the nurse, psychiatric unit pharmacist,  and writer). Complete current electronic health record for patient has been reviewed today including consultant notes, ancillary staff notes, nurses and psychiatric tech notes. Suicide risk assessment completed and patient deemed to be of low risk for suicide at this time. The following regarding medications was addressed during rounds with patient:   the risks and benefits of the proposed medication. The patient was given the opportunity to ask questions. Informed consent given to the use of the above medications. Will continue to adjust psychiatric and non-psychiatric medications (see above \"medication\" section and orders section for details) as deemed appropriate & based upon diagnoses and response to treatment. I will continue to order blood tests/labs and diagnostic tests as deemed appropriate and review results as they become available (see orders for details and above listed lab/test results). I will order psychiatric records from previous [facility] hospitals to further elucidate the nature of patient's psychopathology and review once available. I will gather additional collateral information from friends, family and o/p treatment team to further elucidate the nature of patient's psychopathology and baselline level of psychiatric functioning.          I certify that this patient's inpatient psychiatric hospital services furnished since the previous certification were, and continue to be, required for treatment that could reasonably be expected to improve the patient's condition, or for diagnostic study, and that the patient continues to need, on a daily basis, active treatment furnished directly by or requiring the supervision of inpatient psychiatric facility personnel. In addition, the hospital records show that services furnished were intensive treatment services, admission or related services, or equivalent services.     EXPECTED DISCHARGE DATE/DAY: TBD     DISPOSITION: Home       Signed By:   Jena Aguirre MD  4/5/2021

## 2021-04-05 NOTE — BH NOTES
GROUP THERAPY PROGRESS NOTE    Patient is participating in Discharge Planning Group. Group time: 40 minutes     Personal goal for participation: Process feelings related to discharge and/or feelings/goals for today. Goal orientation: Personal    Group therapy participation: active    Therapeutic interventions reviewed and discussed: Group discussion was focused on discharge plans and anxiety related to this. Group members discussed discharge plans and outpatient support. Patients discussed their goals for today as well and what they are working on with treatment team to get ready for discharge. Patients discussed changes they desire to make in their lives to improve their mental health. Impression of participation: Pt present for the beginning of group, left early due to anxiety/feeling irritable and wanting to walk the halls. Pt processed feelings about inpatient hospitalization, stated she is grateful to process with people and feels her medication is working. Pt processed emotions about having to take medications and dx.      JARRET Koehler

## 2021-04-05 NOTE — GROUP NOTE
LILA  GROUP DOCUMENTATION INDIVIDUAL Group Therapy Note Date: 4/5/2021 Group Start Time: 1500 Group End Time: 5860 Group Topic: Recreational/Music Therapy [facility] [address] ACUTE BEHAV [facility] [place], [address] GROUP DOCUMENTATION GROUP Group Therapy Note Attendees: 7 Attendance: Attended Patient's Goal:  To concentrate on selected task Interventions/techniques: Supported-crafts,games,music Follows Directions: Followed directions Interactions: Interacted appropriately Mental Status: Calm Behavior/appearance: Attentive, Cooperative and Needed prompting Goals Achieved: Able to engage in interactions and Able to listen to others Additional Notes:  Good concentration on selected task Trey Rojas

## 2021-04-05 NOTE — BH NOTES
Behavioral Health Treatment Team Note     Patient goal(s) for today: take medications as scheduled, attend groups, attend TDO hearing  Treatment team focus/goals: adjust medications as needed, discharge planning, collateral     Progress note: Patient was upset and irritable. During rounds she reports that she wants to go home and that the  said the doctor would discharge her. Explained this means that she was committed and that it would be up to the doctor but that she could not go home today. She reports feeling sluggish after taking the Seroquel last night. She reports she does not need to be here because she needs to get fresh air and sunshine and that even inmates get sunshine. She has no insight into why she is here blaming being triggered but not addressing the reason she was committed. Patient demanded to see NEHEMIAS and MD. Nida De La Fuente went to speak to patient who continued to demand to leave. Re-explained the TDO process and the fact that she was committed. She reports wanting to appeal the hearing. Informed  of the request and she reports patient had already stated after the hearing she wanted to appeal and the courts are aware. Patient continued to be upset that she could not leave and was agitated, gripping the table and getting loud. Left patient to calm down with her roommate that she requested to talk to. Prior to this patient was demanding to leave and reporting that her roommate was making her upset and that she needed to leave because of this. Patient appears to try to be splitting or manipulating staff and the situation to try to get discharged despite being told that discharge is not possible due to being committed today. Patient approached nurses station and was rude, agitated, and cursing at nursing staff. She continues to demand to leave and get sunshine and fresh air. She refuses to accept that she can not leave. Spoke with Hira Rowe.  She reports patient appears to be at her baseline per phone conversations since arrival to the unit. She reports she was out of town for the last week and was unaware of any issues while she was gone other than patient was admitted. She asked which police department would have information about the patient's car and was told to inquire with Hira YOUNG. Godmother reports she is close with patient and has a key and will be checking in on patient upon discharge. LOS:  3  Expected LOS: TBD    Insurance info/prescription coverage:  Self-Pay  Date of last family contact:  Efraín Briseno [phone], godmother Ngozi Lucas [phone], friend Muriel Kearney [phone], 4/5/21- spoke with Alejo Vargas requesting physician contact today:  no  Discharge plan:  Return home  Guns in the home:  no   Outpatient provider(s):   To be linked    Participating treatment team members: Dr Yun Hinson MD

## 2021-04-05 NOTE — PROGRESS NOTES
Problem: Altered Thought Process (Adult/Pediatric)  Goal: *STG: Participates in treatment plan  Outcome: Progressing Towards Goal  Goal: *STG: Remains safe in hospital  Outcome: Progressing Towards Goal  Goal: *STG: Complies with medication therapy  Outcome: Progressing Towards Goal  Goal: *STG: Attends activities and groups  Outcome: Progressing Towards Goal   [id number] Report received from [facility]  Patient awake laying in bed. Patient reports is sluggish from the medication she was given. Explained to patient she requested medication to help her sleep on night shift and she could still be drowsy. Patient very uninterested. Patient denies. SI/HI, AVH, anxiety, depression, and pain. AAOX4. Speech clear. Resp. Even and unlabored. NAD noted. Skin WNL for race. Ambulates with steady gait without the use of assistive devices. No other needs, wants, or concerns voiced at this time. Will continue to monitor. 1035 Patient was walking in the hallway and turned around to this nurse and stated, \"What do you call it when I'm walking and having a conversation to myself? \" \"Do you say I'm crazy? \" Explained to patient staff does not call her crazy and she is allowed to do what will make her feel better. Patient verbalized understanding. 1250 Patient became aggressive and loud. Patient continued to demand to leave. Attempted several times to calm patient and she became more aggressive. Patient refused medication for agitation. Patient cursed and yelled at staff several times through out the shift. Patient continued to refuse PRN medication for agitation. Patient was calm at the end of shift and no longer was verbally agressive towards staff.

## 2021-04-05 NOTE — BH NOTES
GROUP THERAPY PROGRESS NOTE    Patient is participating in a Process group. Group time: 1 hour    Personal goal for participation: Discuss patients strengths, positive aspects of life and positive affirmations to increase self-esteem and promote healing. Goal orientation: Personal    Group therapy participation: active    Therapeutic interventions reviewed and discussed: Completion of My Strengths and Qualities activity sheet. Group discussion on personal patients strengths including things they are good at, ways theyve helped others, what makes them unique, and challenges they have overcome. Discussion regarding positive things others say about patients and positive words patient speak over themselves. Group discussed the benefit and power of positive thinking and self-talk and its role in improving mental health challenges. Group discussed having a positive mind set as a helpful coping skill. Impression of participation: Pt presented with euthymic mood, clear speech, logical thought process, calm and cooperative, interacted appropriately with others. Pt discussed the importance of self-esteem, stated her view of self impacts her motivation to change and hope for the future. Pt able to name numerous strengths including her compassion, being a good listener. Pt had good insight and judgement. Pt processed her desire to use more positive self-talk.     JARRET Patel

## 2021-04-05 NOTE — PROGRESS NOTES
Laboratory monitoring for mood stabilizer and antipsychotics:    Recommended baseline monitoring has not been completed based on this patient's current medication regimen. The following labs have been ordered to complete baseline monitoring: height, liver function tests, hemoglobin A1c, lipid panel, TSH      The patient is currently taking the following medication(s):   Current Facility-Administered Medications   Medication Dose Route Frequency    QUEtiapine SR (SEROquel XR) tablet 300 mg  300 mg Oral QHS    senna-docusate (PERICOLACE) 8.6-50 mg per tablet 1 Tab  1 Tab Oral QHS    pantoprazole (PROTONIX) tablet 40 mg  40 mg Oral ACB    multivitamin, tx-iron-ca-min (THERA-M w/ IRON) tablet 1 Tab  1 Tab Oral DAILY    loratadine (CLARITIN) tablet 10 mg  10 mg Oral DAILY     Height, Weight, BMI Estimation  Estimated body mass index is 36.9 kg/m² as calculated from the following:    Height as of 9/6/13: 162.6 cm (64\"). Weight as of this encounter: 97.5 kg (215 lb). Renal Function, Hepatic Function and Chemistry  CrCl cannot be calculated (Unknown ideal weight.). Lab Results   Component Value Date/Time    Sodium 137 04/02/2021 11:34 AM    Potassium 3.8 04/02/2021 11:34 AM    Chloride 100 04/02/2021 11:34 AM    CO2 24 04/02/2021 11:34 AM    Anion gap 13 04/02/2021 11:34 AM    Glucose 116 (H) 04/02/2021 11:34 AM    BUN 24 (H) 04/02/2021 11:34 AM    Creatinine 0.96 04/02/2021 11:34 AM    BUN/Creatinine ratio 25 (H) 04/02/2021 11:34 AM    GFR est AA >60 04/02/2021 11:34 AM    GFR est non-AA >60 04/02/2021 11:34 AM    Calcium 9.6 04/02/2021 11:34 AM    ALT (SGPT) 264 (H) 03/12/2020 03:39 PM    Alk.  phosphatase 143 (H) 03/12/2020 03:39 PM    Protein, total 7.0 03/12/2020 03:39 PM    Albumin 3.3 (L) 03/12/2020 03:39 PM    Globulin 3.7 03/12/2020 03:39 PM    A-G Ratio 0.9 (L) 03/12/2020 03:39 PM    Bilirubin, total 1.1 (H) 03/12/2020 03:39 PM       Lab Results   Component Value Date/Time    Glucose 116 (H) 04/02/2021 11:34 AM    Glucose (POC) 81 01/01/2012 01:03 PM     Hematology  Lab Results   Component Value Date/Time    WBC 8.7 04/02/2021 11:34 AM    Hemoglobin (POC) 12.2 01/01/2012 01:03 PM    HGB 11.4 (L) 04/02/2021 11:34 AM    Hematocrit (POC) 36 01/01/2012 01:03 PM    HCT 34.7 (L) 04/02/2021 11:34 AM    PLATELET 381 69/47/4184 11:34 AM    MCV 81.6 04/02/2021 11:34 AM       Vitals  Visit Vitals  BP (!) 97/58 (BP 1 Location: Right upper arm, BP Patient Position: Lying)   Pulse 83   Temp 98.5 °F (36.9 °C)   Resp 18   Wt 97.5 kg (215 lb)   SpO2 100%   BMI 36.90 kg/m²       Pregnancy Test  Lab Results   Component Value Date/Time    HCG urine, QL NEGATIVE  03/12/2020 05:07 PM    Pregnancy test,urine (POC) Negative 04/02/2021 02:02 PM    HCG, Ql. NEGATIVE  01/01/2012 01:00 PM       Meenakshi Duckworth, [address] [place] [address], [address] [place] [address] (pharmacy)

## 2021-04-05 NOTE — GROUP NOTE
LILA  GROUP DOCUMENTATION INDIVIDUAL Group Therapy Note Date: 4/5/2021 Group Start Time: 1100 Group End Time: 1200 Group Topic: Topic Group [address] [facility] 3 ACUTE BEHAV [facility], [address] [facility] GROUP DOCUMENTATION GROUP Group Therapy Note Attendees: 6 Attendance: Attended Patient's Goal:  To participate in relaxation activity Interventions/techniques: Supported -game Follows Directions: Followed directions Interactions: Interacted appropriately Mental Status: Calm Behavior/appearance: Attentive, Cooperative and Needed prompting Goals Achieved: Able to engage in interactions and Able to listen to others Additional Notes:   
 
Trey Rojas

## 2021-04-06 VITALS
HEIGHT: 63 IN | SYSTOLIC BLOOD PRESSURE: 114 MMHG | WEIGHT: 215 LBS | HEART RATE: 95 BPM | TEMPERATURE: 98.1 F | DIASTOLIC BLOOD PRESSURE: 68 MMHG | BODY MASS INDEX: 38.09 KG/M2 | RESPIRATION RATE: 18 BRPM | OXYGEN SATURATION: 100 %

## 2021-04-06 LAB
ALBUMIN SERPL-MCNC: 3.4 G/DL (ref 3.5–5)
ALBUMIN/GLOB SERPL: 0.9 {RATIO} (ref 1.1–2.2)
ALP SERPL-CCNC: 86 U/L (ref 45–117)
ALT SERPL-CCNC: 27 U/L (ref 12–78)
AST SERPL-CCNC: 22 U/L (ref 15–37)
BILIRUB DIRECT SERPL-MCNC: 0.2 MG/DL (ref 0–0.2)
BILIRUB SERPL-MCNC: 0.3 MG/DL (ref 0.2–1)
CHOLEST SERPL-MCNC: 146 MG/DL
GLOBULIN SER CALC-MCNC: 3.7 G/DL (ref 2–4)
HDLC SERPL-MCNC: 86 MG/DL
HDLC SERPL: 1.7 {RATIO} (ref 0–5)
LDLC SERPL CALC-MCNC: 52.2 MG/DL (ref 0–100)
LIPID PROFILE,FLP: NORMAL
PROT SERPL-MCNC: 7.1 G/DL (ref 6.4–8.2)
TRIGL SERPL-MCNC: 39 MG/DL (ref ?–150)
TSH SERPL DL<=0.05 MIU/L-ACNC: 1.02 UIU/ML (ref 0.36–3.74)
VLDLC SERPL CALC-MCNC: 7.8 MG/DL

## 2021-04-06 PROCEDURE — 80076 HEPATIC FUNCTION PANEL: CPT

## 2021-04-06 PROCEDURE — 74011250637 HC RX REV CODE- 250/637: Performed by: PSYCHIATRY & NEUROLOGY

## 2021-04-06 PROCEDURE — 80061 LIPID PANEL: CPT

## 2021-04-06 PROCEDURE — 36415 COLL VENOUS BLD VENIPUNCTURE: CPT

## 2021-04-06 PROCEDURE — 74011250637 HC RX REV CODE- 250/637: Performed by: NURSE PRACTITIONER

## 2021-04-06 PROCEDURE — 84443 ASSAY THYROID STIM HORMONE: CPT

## 2021-04-06 RX ORDER — TRAZODONE HYDROCHLORIDE 50 MG/1
50 TABLET ORAL
Qty: 30 TAB | Refills: 0 | Status: ON HOLD | OUTPATIENT
Start: 2021-04-06 | End: 2022-10-26

## 2021-04-06 RX ORDER — QUETIAPINE 300 MG/1
300 TABLET, FILM COATED, EXTENDED RELEASE ORAL
Qty: 30 TAB | Refills: 0 | Status: ON HOLD | OUTPATIENT
Start: 2021-04-06 | End: 2022-10-26

## 2021-04-06 RX ADMIN — MULTIPLE VITAMINS W/ MINERALS TAB 1 TABLET: TAB at 09:17

## 2021-04-06 RX ADMIN — ACETAMINOPHEN 650 MG: 325 TABLET ORAL at 09:17

## 2021-04-06 RX ADMIN — TRAZODONE HYDROCHLORIDE 50 MG: 50 TABLET ORAL at 00:53

## 2021-04-06 RX ADMIN — PANTOPRAZOLE SODIUM 40 MG: 40 TABLET, DELAYED RELEASE ORAL at 05:32

## 2021-04-06 RX ADMIN — LORATADINE 10 MG: 10 TABLET ORAL at 09:17

## 2021-04-06 NOTE — DISCHARGE INSTRUCTIONS
Patient Education     If I feel I am at risk of hurting myself or others, I will call the crisis office and speak with a crisis worker who will assist me during my crisis. [address]  [phone]  [facility] [place] [address] [phone]  [address] [facility]-  [phone]  [facility]  [phone]  [place] [facility]-  [phone]       Bipolar Disorder: Care Instructions  Your Care Instructions     Bipolar disorder is an illness that causes extreme mood changes, from times of very high energy (manic episodes) to times of depression. But many people with bipolar disorder show only the symptoms of depression. These moods may cause problems with your work, school, family life, friendships, and how well you function. This disease is also called manic-depression. There is no cure for bipolar disorder, but it can be helped with medicines. Counseling may also help. It is important to take your medicines exactly as prescribed, even when you feel well. You may need lifelong treatment. Follow-up care is a key part of your treatment and safety. Be sure to make and go to all appointments, and call your doctor if you are having problems. It's also a good idea to know your test results and keep a list of the medicines you take. How can you care for yourself at home? · Be safe with medicines. Take your medicines exactly as prescribed. Do not stop or change a medicine without talking to your doctor first. Jimbo Klein and your doctor may need to try different combinations of medicines to find what works for you. · Take your medicines on schedule to keep your moods even. When you feel good, you may think that you do not need your medicines. But it is important to keep taking them. · Go to your counseling sessions. Call and talk with your counselor if you can't go to a session or if you don't think the sessions are helping. Do not just stop going.   · Get at least 27 minutes of activity on most days of the week. Walking is a good choice. You also may want to do other things, such as running, swimming, or cycling. · Get enough sleep. Keep your room dark and quiet. Try to go to bed at the same time every night. · Eat a healthy diet. This includes whole grains, dairy, fruits, vegetables, and protein. Eat foods from each of these groups. · Try to lower your stress. Manage your time, build a strong support system, and lead a healthy lifestyle. To lower your stress, try physical activity, slow deep breathing, or getting a massage. · Do not use alcohol, marijuana, or illegal drugs. · Learn the early signs of your mood changes. You can then take steps to help yourself feel better. · Ask for help from friends and family when you need it. You may need help with daily chores when you are depressed. When you are manic, you may need support to control your high energy levels. What should you do if someone in your family has bipolar disorder? · Learn about the disease and signs it's getting worse. · Remind your family member you love them. · Make a plan with all family members about how to take care of your loved one when symptoms are bad. · Remind yourself it will take time for changes to occur. · Try not to blame yourself for the disease. · Know your legal rights and the legal rights of your family member. Support groups or counselors can help with this information. · Take care of yourself. Keep up with your interests, such as career, hobbies, and friends. Use exercise, positive self-talk, deep breathing, and other relaxing exercises to help lower your stress. · Give yourself time to grieve. You may need to deal with emotions such as anger, fear, and frustration. · If you are having a hard time with your feelings or with your relationship with your family member, talk with a counselor. When should you call for help? Call 911 anytime you think you may need emergency care. For example, call if:    · You feel like hurting yourself or someone else.     · Someone who has bipolar disorder displays dangerous behavior, and you think the person might hurt himself or herself or someone else. Call your doctor now or seek immediate medical care if:    · You hear voices.     · Someone you know has bipolar disorder and talks about suicide. Keep the numbers for these national suicide hotlines: 2-750-041-TALK (4-307.673.2030) and 7-837-BCSPIVW (1-925.340.2704). If a suicide threat seems real, with a specific plan and a way to carry it out, stay with the person, or ask someone you trust to stay with the person, until you can get help.     · Someone you know has bipolar disorder and:  ? Starts to give away possessions. ? Is using illegal drugs or drinking alcohol heavily. ? Talks or writes about death, including writing suicide notes or talking about guns, knives, or pills. ? Talks or writes about hurting someone else. ? Starts to spend a lot of time alone. ? Acts very aggressively or suddenly appears calm. ? Talks about beliefs that are not based in reality (delusions). Watch closely for changes in your health, and be sure to contact your doctor if:    · You cannot go to your counseling sessions. Where can you learn more? Go to http://www.doss.com/  Enter K052 in the search box to learn more about \"Bipolar Disorder: Care Instructions. \"  Current as of: September 23, 2020               Content Version: 12.8  © 2006-2021 [employer]. Care instructions adapted under license by [employer] (which disclaims liability or warranty for this information). If you have questions about a medical condition or this instruction, always ask your healthcare professional. [place] [address] any warranty or liability for your use of this information.        DISCHARGE SUMMARY    NAME:Jamey LISA Waqas Starr  : 1985  MRN: [id number]    The patient Katherine Siegel exhibits the ability to control behavior in a less restrictive environment. Patient's level of functioning is improving. No assaultive/destructive behavior has been observed for the past 24 hours. No suicidal/homicidal threat or behavior has been observed for the past 24 hours. There is no evidence of serious medication side effects. Patient has not been in physical or protective restraints for at least the past 24 hours. If weapons involved, how are they secured? No weapons    Is patient aware of and in agreement with discharge plan? Patient agrees to return home and follow up with outpatient providers    Arrangements for medication:  Prescriptions given to patient    Copy of discharge instructions to provider?:  Fax to [facility]    Arrangements for transportation home:  Family to transport    Keep all follow up appointments as scheduled, continue to take prescribed medications per physician instructions. Mental health crisis number:  854 or your local mental health crisis line number at [phone].       Mental Health Emergency WARM LINE      0-733-462-MHAV (0302)      M-F: 9am to 9pm      Sat & Sun: 5pm - 9pm  National suicide prevention lines:                             5-347-GBITPJH (4-963-023-222-717-3178)       5-897-770-TALK (0-424.870.8035)    Crisis Text Line:  Text HOME to 248383

## 2021-04-06 NOTE — GROUP NOTE
LILA  GROUP DOCUMENTATION INDIVIDUAL Group Therapy Note Date: 4/6/2021 Group Start Time: 0900 Group End Time: 1000 Group Topic: Topic Group [address] [facility] [address] 3 ACUTE BEHAV [facility], [address] GROUP DOCUMENTATION GROUP Group Therapy Note Attendees: 5 Attendance: Attended Patient's Goal:  To learn how to love yourself Interventions/techniques: Supported-handout Follows Directions: Followed directions Interactions: Interacted appropriately Mental Status: Calm Behavior/appearance: Attentive, Cooperative and Needed prompting Goals Achieved: Able to engage in interactions, Able to listen to others, Able to self-disclose, Discussed coping and Discussed self-esteem issues Additional Notes:   
 
Sheeba Oh

## 2021-04-06 NOTE — DISCHARGE SUMMARY
PSYCHIATRIC DISCHARGE SUMMARY         IDENTIFICATION:    Patient Name  Sheela Oropeza   Date of Birth 1985   [facility] [id number]   Medical Record Number  [id number]      Age  28 y.o. PCP Shree Rice MD   Admit date:  4/2/2021    Discharge date: 4/6/2021   Room Number  308/01  @ [facility]   Date of Service  4/6/2021            TYPE OF DISCHARGE: REGULAR               CONDITION AT DISCHARGE: improved       PROVISIONAL & DISCHARGE DIAGNOSES:    Problem List  Never Reviewed          Codes Class    * (Principal) Monisha ([facility] [address] ICD-10-CM: F30.9  ICD-9-CM: 296.00         Psychosis ([facility] [address] ICD-10-CM: F29  ICD-9-CM: 298.9               Active Hospital Problems    *Monisha ([facility] [address]      Psychosis ([facility] [address]        DISCHARGE DIAGNOSIS:   Axis I:  SEE ABOVE  Axis II: SEE ABOVE  Axis III: SEE ABOVE  Axis IV:  lack of structure  Axis V:  <50 on admission, 55+ on discharge     CC & HISTORY OF PRESENT ILLNESS:   28 y.o. BLACK/ female with a past psychiatric history significant for PTSD, who presents at this time with complaints of (and/or evidence of) the following emotional symptoms: agitation and psychotic behavior. Additional symptomatology include monisha. The above symptoms have been present for 2+ weeks. These symptoms are of moderate to high severity. These symptoms are constant in nature. The patient's condition has been precipitated by psychosocial stressors. Patient's condition made worse by continued illicit drug use as well as treatment noncompliance. UDS: +THC; BAL=0.     Per ED and nursing report, the patient was observed driving erratically, on the median, then walking in and out of traffic; when approached for assistance by police became belligerent. She was brought to the ED under suspicion of intoxication but noted to have no alcohol in her system.  Once in the emergency room, she was agitated and required IM PRNs; on the unit the patient required additional IMs for agitation including Haldol and Ativan. Patient seen s/p PRN administration. She acknowledges the above history, but is evasive regarding the circumstances leading to the admission other than stating her PTSD was triggered by recent life stressors including resigning from her job and being fired from a position prior to this. She is discharge focused and oppositional with treatment team.     SOCIAL HISTORY:    Social History     Socioeconomic History    Marital status:      Spouse name: Not on file    Number of children: Not on file    Years of education: Not on file    Highest education level: Not on file   Occupational History    Not on file   Social Needs    Financial resource strain: Not on file    Food insecurity     Worry: Not on file     Inability: Not on file    Transportation needs     Medical: Not on file     Non-medical: Not on file   Tobacco Use    Smoking status: Never Smoker    Smokeless tobacco: Current User   Substance and Sexual Activity    Alcohol use: No    Drug use: Yes     Types: Marijuana     Comment: vape and smoke marijuana    Sexual activity: Not on file   Lifestyle    Physical activity     Days per week: Not on file     Minutes per session: Not on file    Stress: Not on file   Relationships    Social connections     Talks on phone: Not on file     Gets together: Not on file     Attends Buddhist service: Not on file     Active member of club or organization: Not on file     Attends meetings of clubs or organizations: Not on file     Relationship status: Not on file    Intimate partner violence     Fear of current or ex partner: Not on file     Emotionally abused: Not on file     Physically abused: Not on file     Forced sexual activity: Not on file   Other Topics Concern    Not on file   Social History Narrative    Not on file      FAMILY HISTORY:   No family history on file.           HOSPITALIZATION COURSE:    Mita Ramon was admitted to the inpatient psychiatric unit [facility] for acute psychiatric stabilization in regards to symptomatology as described in the HPI above. The differential diagnosis at time of admission included: schizophrenia vs substance induced psychotic disorder schizoaffective vs bipolar MDD vs adjustment disorder. While on the unit Jamey Rahman was involved in individual, group, occupational and milieu therapy. Psychiatric medications were adjusted during this hospitalization. Vivian Qureshi demonstrated a progressive improvement in overall condition. Much of patient's initial presentation appeared to be related to situational stressors, effects of medication non-compliance drugs of abuse, and psychological factors. Please see individual progress notes for more specific details regarding patient's hospitalization course. Vivian Qureshi reports feeling well and moods are good. Denies SI/HI/AH/VH. No aggression or violence. Appropriately interactive and aware. Tolerating medications well. Eating and sleeping fairly. Compassionate about the others on the unit and their plights. Patient with request for discharge today. There are no grounds to seek a TDO. At time of discharge, Vivian Qureshi is without significant problems of depression, psychosis, or monisha. Patient free of suicidal and homicidal ideations (appears to be at very low risk of suicide or homicide) and reports many positive predictive factors in terms of not attempting suicide or homicide. Overall presentation at time of discharge is most consistent with the diagnosis of Bipolar Monisha. Patient has maximized benefit to be derived from acute inpatient psychiatric treatment. All members of the treatment team concur with each other in regards to plans for discharge today. Patient and family are aware and in agreement with discharge and discharge plan.          LABS AND IMAGAING:    Labs Reviewed   CBC WITH AUTOMATED DIFF - Abnormal; Notable for the following components:       Result Value    HGB 11.4 (*)     HCT 34.7 (*)     RDW 16.2 (*)     NEUTROPHILS 78 (*)     LYMPHOCYTES 11 (*)     IMMATURE GRANULOCYTES 1 (*)     All other components within normal limits   METABOLIC PANEL, BASIC - Abnormal; Notable for the following components:    Glucose 116 (*)     BUN 24 (*)     BUN/Creatinine ratio 25 (*)     All other components within normal limits   DRUG SCREEN, URINE - Abnormal; Notable for the following components:    THC (TH-CANNABINOL) Positive (*)     All other components within normal limits   URINALYSIS W/ REFLEX CULTURE - Abnormal; Notable for the following components:    Ketone 15 (*)     All other components within normal limits   HEPATIC FUNCTION PANEL - Abnormal; Notable for the following components:    Albumin 3.4 (*)     A-G Ratio 0.9 (*)     All other components within normal limits   ETHYL ALCOHOL   COVID-19 WITH INFLUENZA A/B   TSH 3RD GENERATION   LIPID PANEL   HCG URINE, QL. - POC     No results found for: DS35, PHEN, PHENO, PHENT, DILF, DS39, PHENY, PTN, VALF2, VALAC, VALP, VALPR, DS6, CRBAM, CRBAMP, CARB2, XCRBAM  Admission on 04/02/2021   Component Date Value Ref Range Status    WBC 04/02/2021 8.7  3.6 - 11.0 K/uL Final    RBC 04/02/2021 4.25  3.80 - 5.20 M/uL Final    HGB 04/02/2021 11.4* 11.5 - 16.0 g/dL Final    HCT 04/02/2021 34.7* 35.0 - 47.0 % Final    MCV 04/02/2021 81.6  80.0 - 99.0 FL Final    MCH 04/02/2021 26.8  26.0 - 34.0 PG Final    MCHC 04/02/2021 32.9  30.0 - 36.5 g/dL Final    RDW 04/02/2021 16.2* 11.5 - 14.5 % Final    PLATELET 64/70/7020 875  150 - 400 K/uL Final    MPV 04/02/2021 9.2  8.9 - 12.9 FL Final    NRBC 04/02/2021 0.0  0  WBC Final    ABSOLUTE NRBC 04/02/2021 0.00  0.00 - 0.01 K/uL Final    NEUTROPHILS 04/02/2021 78* 32 - 75 % Final    LYMPHOCYTES 04/02/2021 11* 12 - 49 % Final    MONOCYTES 04/02/2021 8  5 - 13 % Final    EOSINOPHILS 04/02/2021 1  0 - 7 % Final    BASOPHILS 04/02/2021 1  0 - 1 % Final    IMMATURE GRANULOCYTES 04/02/2021 1* 0.0 - 0.5 % Final    ABS. NEUTROPHILS 04/02/2021 6.9  1.8 - 8.0 K/UL Final    ABS. LYMPHOCYTES 04/02/2021 1.0  0.8 - 3.5 K/UL Final    ABS. MONOCYTES 04/02/2021 0.7  0.0 - 1.0 K/UL Final    ABS. EOSINOPHILS 04/02/2021 0.1  0.0 - 0.4 K/UL Final    ABS. BASOPHILS 04/02/2021 0.1  0.0 - 0.1 K/UL Final    ABS. IMM.  GRANS. 04/02/2021 0.0  0.00 - 0.04 K/UL Final    DF 04/02/2021 AUTOMATED    Final    Sodium 04/02/2021 137  136 - 145 mmol/L Final    Potassium 04/02/2021 3.8  3.5 - 5.1 mmol/L Final    Chloride 04/02/2021 100  97 - 108 mmol/L Final    CO2 04/02/2021 24  21 - 32 mmol/L Final    Anion gap 04/02/2021 13  5 - 15 mmol/L Final    Glucose 04/02/2021 116* 65 - 100 mg/dL Final    BUN 04/02/2021 24* 6 - 20 MG/DL Final    Creatinine 04/02/2021 0.96  0.55 - 1.02 MG/DL Final    BUN/Creatinine ratio 04/02/2021 25* 12 - 20   Final    GFR est AA 04/02/2021 >60  >60 ml/min/1.73m2 Final    GFR est non-AA 04/02/2021 >60  >60 ml/min/1.73m2 Final    Calcium 04/02/2021 9.6  8.5 - 10.1 MG/DL Final    ALCOHOL(ETHYL),SERUM 04/02/2021 <10  <10 MG/DL Final    AMPHETAMINES 04/02/2021 Negative  NEG   Final    BARBITURATES 04/02/2021 Negative  NEG   Final    BENZODIAZEPINES 04/02/2021 Negative  NEG   Final    COCAINE 04/02/2021 Negative  NEG   Final    METHADONE 04/02/2021 Negative  NEG   Final    OPIATES 04/02/2021 Negative  NEG   Final    PCP(PHENCYCLIDINE) 04/02/2021 Negative  NEG   Final    THC (TH-CANNABINOL) 04/02/2021 Positive* NEG   Final    Drug screen comment 04/02/2021 (NOTE)   Final    Color 04/02/2021 YELLOW/STRAW    Final    Appearance 04/02/2021 CLEAR  CLEAR   Final    Specific gravity 04/02/2021 1.010  1.003 - 1.030   Final    pH (UA) 04/02/2021 6.0  5.0 - 8.0   Final    Protein 04/02/2021 Negative  NEG mg/dL Final    Glucose 04/02/2021 Negative  NEG mg/dL Final    Ketone 04/02/2021 15* NEG mg/dL Final    Bilirubin 04/02/2021 Negative  NEG   Final    Blood 04/02/2021 Negative  NEG   Final    Urobilinogen 04/02/2021 0.2  0.2 - 1.0 EU/dL Final    Nitrites 04/02/2021 Negative  NEG   Final    Leukocyte Esterase 04/02/2021 Negative  NEG   Final    WBC 04/02/2021 0-4  0 - 4 /hpf Final    RBC 04/02/2021 0-5  0 - 5 /hpf Final    Epithelial cells 04/02/2021 FEW  FEW /lpf Final    Bacteria 04/02/2021 Negative  NEG /hpf Final    UA:UC IF INDICATED 04/02/2021 CULTURE NOT INDICATED BY UA RESULT  CNI   Final    SARS-CoV-2 04/02/2021 Not detected  NOTD   Final    Influenza A by PCR 04/02/2021 Not detected  NOTD   Final    Influenza B by PCR 04/02/2021 Not detected  NOTD   Final    Pregnancy test,urine (POC) 04/02/2021 Negative  NEG   Final    TSH 04/06/2021 1.02  0.36 - 3.74 uIU/mL Final    Protein, total 04/06/2021 7.1  6.4 - 8.2 g/dL Final    Albumin 04/06/2021 3.4* 3.5 - 5.0 g/dL Final    Globulin 04/06/2021 3.7  2.0 - 4.0 g/dL Final    A-G Ratio 04/06/2021 0.9* 1.1 - 2.2   Final    Bilirubin, total 04/06/2021 0.3  0.2 - 1.0 MG/DL Final    Bilirubin, direct 04/06/2021 0.2  0.0 - 0.2 MG/DL Final    Alk. phosphatase 04/06/2021 86  45 - 117 U/L Final    AST (SGOT) 04/06/2021 22  15 - 37 U/L Final    ALT (SGPT) 04/06/2021 27  12 - 78 U/L Final    LIPID PROFILE 04/06/2021        Final    Cholesterol, total 04/06/2021 146  <200 MG/DL Final    Triglyceride 04/06/2021 39  <150 MG/DL Final    HDL Cholesterol 04/06/2021 86  MG/DL Final    LDL, calculated 04/06/2021 52.2  0 - 100 MG/DL Final    VLDL, calculated 04/06/2021 7.8  MG/DL Final    CHOL/HDL Ratio 04/06/2021 1.7  0.0 - 5.0   Final     No results found. DISPOSITION:    Home. Patient to f/u with drug/etoh rehabilitation, psychiatric, and psychotherapy appointments. Patient is to f/u with internist as directed. FOLLOW-UP CARE:    Activity as tolerated  Regular diet  Wound Care: none needed.   Follow-up Information Follow up With Specialties Details Why [facility]  Call today Please call Rapid [facility] phone line [phone] between 8:00AM -2:00PM to complete intake assessment for ongoing mental health case management, therapy and medication management. Please inquire about medical services through the [facility] clinic. [id number] [facility]  [facility] ([address]) [facility] [employer] Connect with [facility] and [place] [address] for resources and support [phone]    Laz Bray MD General Surgery   [address]  [address] [phone]                   PROGNOSIS:   Clark Pugh ---- based on nature of patient's pathology/ies and treatment compliance issues. Prognosis is greatly dependent upon patient's ability to remain sober and to follow up with scheduled appointments as well as to comply with psychiatric medications as prescribed. DISCHARGE MEDICATIONS:     Informed consent given for the use of following psychotropic medications:  Current Discharge Medication List      START taking these medications    Details   QUEtiapine SR (SEROquel XR) 300 mg sr tablet Take 1 Tab by mouth nightly. Indications: bipolar depression  Qty: 30 Tab, Refills: 0      traZODone (DESYREL) 50 mg tablet Take 1 Tab by mouth nightly as needed for Sleep (For insomnia). Indications: insomnia associated with depression  Qty: 30 Tab, Refills: 0         CONTINUE these medications which have NOT CHANGED    Details   multivitamin with iron tablet Take 1 Tab by mouth daily. cholecalciferol, vitamin D3, (VITAMIN D3 PO) Take  by mouth. dicyclomine (BentyL) 10 mg capsule Take 2 Caps by mouth three (3) times daily as needed for Abdominal Cramps. Qty: 20 Cap, Refills: 0      omeprazole (PRILOSEC OTC) 20 mg tablet Take 1 Tab by mouth daily.   Qty: 30 Tab, Refills: 0         STOP taking these medications       venlafaxine-SR (EFFEXOR-XR) 37.5 mg capsule Comments:   Reason for Stopping:         Cetirizine (ZyrTEC) 10 mg cap Comments:   Reason for Stopping:         fexofenadine-pseudoephedrine (Allegra-D 12 Hour)  mg Tb12 Comments:   Reason for Stopping:         doxylamine succinate (UNISOM) 25 mg tablet Comments:   Reason for Stopping:                      A coordinated, multidisplinary treatment team round was conducted with Brianna Singh is done daily here at [facility]. This team consists of the nurse, psychiatric unit pharmacist,  and Girish Santos. I have spent greater than 35 minutes on discharge work.     Signed:  Nolan Cervantes MD  4/6/2021

## 2021-04-06 NOTE — PROGRESS NOTES
Problem: Altered Thought Process (Adult/Pediatric)  Goal: *STG: Participates in treatment plan  Outcome: Progressing Towards Goal  Goal: *STG: Seeks staff when feelings of anxiety and fear arise  Outcome: Progressing Towards Goal  Goal: *STG: Complies with medication therapy  Outcome: Progressing Towards Goal  Goal: *STG: Attends activities and groups  Outcome: Progressing Towards Goal

## 2021-04-06 NOTE — PROGRESS NOTES
Problem: Altered Thought Process (Adult/Pediatric)  Goal: *STG: Participates in treatment plan  Outcome: Progressing Towards Goal  Goal: *STG: Remains safe in hospital  Outcome: Progressing Towards Goal  Goal: *STG: Seeks staff when feelings of anxiety and fear arise  Outcome: Progressing Towards Goal  Goal: *STG: Complies with medication therapy  Outcome: Progressing Towards Goal  Goal: *STG: Attends activities and groups  Outcome: Progressing Towards Goal  Goal: *STG: Decreased delusional thinking  Outcome: Progressing Towards Goal  Goal: *STG: Decreased hallucinations  Outcome: Progressing Towards Goal  Goal: *STG: Absence of lethality  Outcome: Progressing Towards Goal  Goal: *STG: Demonstrates ability to understand and use improved judgment in daily activities and relationships  Outcome: Progressing Towards Goal  Goal: *LTG: Returns to baseline functioning  Outcome: Progressing Towards Goal  Goal: Interventions  Outcome: Progressing Towards Goal    Patient alert and oriented X4. She has been interacting with peers and staff. Patient denied depression, anxiety, SI/HI, AVH, and pain at this time. Patient remains labile, preoccupied with medications, and displays paranoia. Compliant with PM medications. No PRNs given, vital signs stable and Q15 minute checks continue to maintain safety. RN will continue to monitor.

## 2021-04-06 NOTE — PROGRESS NOTES
Patient rested 3 hours during the night. AM labs drawn and sent to the lab- able to draw for Lipid, Hepatic function, and TSH, unable to draw for A1C. Reordered for A1C tomorrow AM. No distress noted. Will continue to monitor.

## 2021-04-06 NOTE — BH NOTES
Behavioral Health Treatment Team Note      Patient goal(s) for today: take medications as scheduled, attend groups, attend TDO hearing  Treatment team focus/goals: adjust medications as needed, discharge planning, collateral      Progress note: Patient presented with appropriate eye contact, clear speech, appropriate content, linear thought stream, good judgment and insight, denied SI/HI. Patient notified of ada-funding and medication. Medications sent to [place] [address] and the second two-weeks sent to [facility] on [address], per patient request. She reports she feels tired and didn't sleep great. She attributes her issues with sleep to the environment. Medications and labs reviewed. Patient spoke at length about complaints with staff and issues on the unit. Staff addressed through supportive counseling and empathy. Patient remains discharge-oriented and plans to follow up with outpatient provider while also returning home and having support through god-mother.       LOS:  4                       Expected LOS: TBD     Insurance info/prescription coverage:  Self-Pay  Date of last family contact:  Jayro Solitario [phone], godmother Brooklynn Barronett [phone], friend Kim Chandra [phone], 4/5/21- spoke with Madeline Huffman requesting physician contact today:  no  Discharge plan:  Return home  Guns in the home:  no   Outpatient provider(s):   To be linked     Participating treatment team members: Romayne Red, MSW, Dr Danilo Sanchez MD

## 2021-04-06 NOTE — SUICIDE SAFETY PLAN
SAFETY PLAN    A suicide Safety Plan is a document that supports someone when they are having thoughts of suicide. Warning Signs that indicate a suicidal crisis may be developing: What (situations, thoughts, feelings, body sensations, behaviors, etc.) do you experience that lets you know you are beginning to think about suicide? 1. anxious  2. ruminating  3. Not sleeping    Internal Coping Strategies:  What things can I do (relaxation techniques, hobbies, physical activities, etc.) to take my mind off my problems without contacting another person? 1. Breathing exercises  2. Walking  3. journaling    People and social settings that provide distraction: Who can I call or where can I go to distract me? 1. Name: Lili Mata  Phone: [phone]  2. Name: Radha Alamo  Phone: [phone]  3. Place: [place]           4. Place: [place] [address] whom I can ask for help: Who can I call when I need help - for example, friends, family, clergy, someone else? 1. Name: Lulu Cunha               Phone: [phone]  2. Name: Lili Quijanoall  Phone: [phone]  3. Name: Radhajose alberto Alamo  Phone: [phone]    [facility] or [address] [place] [address] I can contact during a crisis: Who can I call for help - for example, my doctor, my psychiatrist, my psychologist, a mental health provider, a suicide hotline? 1. Clinician Name: Deborah Valerio MD-PCP Phone: [phone]      Clinician Pager or Emergency Contact #: [phone]    2. Clinician Name: Robin [address] [place] [address] ([address]) Warm Line  Phone: [phone]      Clinician Pager or Emergency Contact #: [phone]    3. Suicide Prevention Lifeline: 3-178-067-ZCBP (5393)    4.  [address] [place] [address] Emergency Services -  for example, [facility] suicide hotline, [facility] Hotline: [phone]      Emergency Services Address: [address] [facility], [address] [facility]      Emergency Services Phone: [phone]    Making the environment safe: How can I make my environment (house/apartment/living space) safer? For example, can I remove guns, medications, and other items? 1. No guns or weapons  2.  No safety concerns
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
## 2021-04-10 ENCOUNTER — HOSPITAL ENCOUNTER (EMERGENCY)
Age: 36
Discharge: PSYCHIATRIC OR SUBSTANCE USE FACILITY | End: 2021-04-11
Attending: EMERGENCY MEDICINE
Payer: COMMERCIAL

## 2021-04-10 DIAGNOSIS — R46.2 BIZARRE BEHAVIOR: Primary | ICD-10-CM

## 2021-04-10 DIAGNOSIS — F30.9 MANIC EPISODE (HCC): ICD-10-CM

## 2021-04-10 DIAGNOSIS — F23 ACUTE PSYCHOSIS (HCC): ICD-10-CM

## 2021-04-10 DIAGNOSIS — F12.10 CANNABIS ABUSE: ICD-10-CM

## 2021-04-10 DIAGNOSIS — F43.10 PTSD (POST-TRAUMATIC STRESS DISORDER): ICD-10-CM

## 2021-04-10 LAB
ALBUMIN SERPL-MCNC: 3.1 G/DL (ref 3.5–5)
ALBUMIN/GLOB SERPL: 0.8 {RATIO} (ref 1.1–2.2)
ALP SERPL-CCNC: 74 U/L (ref 45–117)
ALT SERPL-CCNC: 23 U/L (ref 12–78)
AMPHET UR QL SCN: NEGATIVE
ANION GAP SERPL CALC-SCNC: 9 MMOL/L (ref 5–15)
APPEARANCE UR: CLEAR
AST SERPL-CCNC: 24 U/L (ref 15–37)
BACTERIA URNS QL MICRO: NEGATIVE /HPF
BARBITURATES UR QL SCN: NEGATIVE
BASOPHILS # BLD: 0.1 K/UL (ref 0–0.1)
BASOPHILS NFR BLD: 1 % (ref 0–1)
BENZODIAZ UR QL: NEGATIVE
BILIRUB SERPL-MCNC: 0.3 MG/DL (ref 0.2–1)
BILIRUB UR QL: NEGATIVE
BUN SERPL-MCNC: 15 MG/DL (ref 6–20)
BUN/CREAT SERPL: 19 (ref 12–20)
CALCIUM SERPL-MCNC: 8.7 MG/DL (ref 8.5–10.1)
CANNABINOIDS UR QL SCN: POSITIVE
CHLORIDE SERPL-SCNC: 105 MMOL/L (ref 97–108)
CO2 SERPL-SCNC: 25 MMOL/L (ref 21–32)
COCAINE UR QL SCN: NEGATIVE
COLOR UR: ABNORMAL
CREAT SERPL-MCNC: 0.8 MG/DL (ref 0.55–1.02)
DIFFERENTIAL METHOD BLD: ABNORMAL
DRUG SCRN COMMENT,DRGCM: ABNORMAL
EOSINOPHIL # BLD: 0.3 K/UL (ref 0–0.4)
EOSINOPHIL NFR BLD: 4 % (ref 0–7)
EPITH CASTS URNS QL MICRO: ABNORMAL /LPF
ERYTHROCYTE [DISTWIDTH] IN BLOOD BY AUTOMATED COUNT: 16.4 % (ref 11.5–14.5)
ETHANOL SERPL-MCNC: <10 MG/DL
FLUAV RNA SPEC QL NAA+PROBE: NOT DETECTED
FLUBV RNA SPEC QL NAA+PROBE: NOT DETECTED
GLOBULIN SER CALC-MCNC: 3.8 G/DL (ref 2–4)
GLUCOSE SERPL-MCNC: 112 MG/DL (ref 65–100)
GLUCOSE UR STRIP.AUTO-MCNC: NEGATIVE MG/DL
HCG UR QL: NEGATIVE
HCG UR QL: NEGATIVE
HCT VFR BLD AUTO: 30.1 % (ref 35–47)
HGB BLD-MCNC: 9.9 G/DL (ref 11.5–16)
HGB UR QL STRIP: ABNORMAL
IMM GRANULOCYTES # BLD AUTO: 0.1 K/UL (ref 0–0.04)
IMM GRANULOCYTES NFR BLD AUTO: 1 % (ref 0–0.5)
KETONES UR QL STRIP.AUTO: ABNORMAL MG/DL
LEUKOCYTE ESTERASE UR QL STRIP.AUTO: ABNORMAL
LYMPHOCYTES # BLD: 2.4 K/UL (ref 0.8–3.5)
LYMPHOCYTES NFR BLD: 28 % (ref 12–49)
MCH RBC QN AUTO: 26.5 PG (ref 26–34)
MCHC RBC AUTO-ENTMCNC: 32.9 G/DL (ref 30–36.5)
MCV RBC AUTO: 80.7 FL (ref 80–99)
METHADONE UR QL: NEGATIVE
MONOCYTES # BLD: 0.8 K/UL (ref 0–1)
MONOCYTES NFR BLD: 9 % (ref 5–13)
NEUTS SEG # BLD: 4.8 K/UL (ref 1.8–8)
NEUTS SEG NFR BLD: 57 % (ref 32–75)
NITRITE UR QL STRIP.AUTO: NEGATIVE
NRBC # BLD: 0 K/UL (ref 0–0.01)
NRBC BLD-RTO: 0 PER 100 WBC
OPIATES UR QL: NEGATIVE
PCP UR QL: NEGATIVE
PH UR STRIP: 5.5 [PH] (ref 5–8)
PLATELET # BLD AUTO: 316 K/UL (ref 150–400)
PMV BLD AUTO: 9.4 FL (ref 8.9–12.9)
POTASSIUM SERPL-SCNC: 4.1 MMOL/L (ref 3.5–5.1)
PROT SERPL-MCNC: 6.9 G/DL (ref 6.4–8.2)
PROT UR STRIP-MCNC: NEGATIVE MG/DL
RBC # BLD AUTO: 3.73 M/UL (ref 3.8–5.2)
RBC #/AREA URNS HPF: ABNORMAL /HPF (ref 0–5)
RBC MORPH BLD: ABNORMAL
SARS-COV-2, COV2: NOT DETECTED
SODIUM SERPL-SCNC: 139 MMOL/L (ref 136–145)
SP GR UR REFRACTOMETRY: 1.01 (ref 1–1.03)
UA: UC IF INDICATED,UAUC: ABNORMAL
UROBILINOGEN UR QL STRIP.AUTO: 0.2 EU/DL (ref 0.2–1)
WBC # BLD AUTO: 8.5 K/UL (ref 3.6–11)
WBC URNS QL MICRO: ABNORMAL /HPF (ref 0–4)

## 2021-04-10 PROCEDURE — 36415 COLL VENOUS BLD VENIPUNCTURE: CPT

## 2021-04-10 PROCEDURE — 81001 URINALYSIS AUTO W/SCOPE: CPT

## 2021-04-10 PROCEDURE — 74011250637 HC RX REV CODE- 250/637: Performed by: EMERGENCY MEDICINE

## 2021-04-10 PROCEDURE — 85025 COMPLETE CBC W/AUTO DIFF WBC: CPT

## 2021-04-10 PROCEDURE — 87636 SARSCOV2 & INF A&B AMP PRB: CPT

## 2021-04-10 PROCEDURE — 82077 ASSAY SPEC XCP UR&BREATH IA: CPT

## 2021-04-10 PROCEDURE — 80307 DRUG TEST PRSMV CHEM ANLYZR: CPT

## 2021-04-10 PROCEDURE — 81025 URINE PREGNANCY TEST: CPT

## 2021-04-10 PROCEDURE — 99285 EMERGENCY DEPT VISIT HI MDM: CPT

## 2021-04-10 PROCEDURE — 80053 COMPREHEN METABOLIC PANEL: CPT

## 2021-04-10 RX ORDER — DOCUSATE SODIUM 100 MG/1
100 CAPSULE, LIQUID FILLED ORAL
Status: COMPLETED | OUTPATIENT
Start: 2021-04-10 | End: 2021-04-10

## 2021-04-10 RX ORDER — QUETIAPINE 150 MG/1
300 TABLET, FILM COATED, EXTENDED RELEASE ORAL ONCE
Status: COMPLETED | OUTPATIENT
Start: 2021-04-10 | End: 2021-04-10

## 2021-04-10 RX ADMIN — QUETIAPINE FUMARATE 300 MG: 150 TABLET, EXTENDED RELEASE ORAL at 22:46

## 2021-04-10 RX ADMIN — DOCUSATE SODIUM 100 MG: 100 CAPSULE, LIQUID FILLED ORAL at 23:42
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
## 2021-04-10 NOTE — ED NOTES
Patient states \" Just come on and hurry up and get my vitals so yall wont say I am not cooperative. \"

## 2021-04-11 VITALS
WEIGHT: 215 LBS | RESPIRATION RATE: 16 BRPM | BODY MASS INDEX: 38.09 KG/M2 | HEART RATE: 79 BPM | TEMPERATURE: 97.9 F | DIASTOLIC BLOOD PRESSURE: 66 MMHG | OXYGEN SATURATION: 100 % | SYSTOLIC BLOOD PRESSURE: 101 MMHG

## 2021-04-11 PROCEDURE — 93005 ELECTROCARDIOGRAM TRACING: CPT

## 2021-04-11 RX ORDER — HALOPERIDOL 5 MG/ML
5 INJECTION INTRAMUSCULAR
Status: DISCONTINUED | OUTPATIENT
Start: 2021-04-11 | End: 2021-04-11 | Stop reason: HOSPADM

## 2021-04-11 NOTE — ED NOTES
Pt would like her Vit D3 and some allegra. RN reported that we will have to wait until morning when central pharmacy is open. Pt says she understands. Will relay to day nurse if pt is still here.

## 2021-04-11 NOTE — ED NOTES
Attempted to draw patients blood. Patient blood started to clot in tubing and patient became tearful and asked to have the needle removed. Needle removed and patient declined to attempt gain. Patient ambulated to bathroom with steady gait and urine sample and covid swab obtained.

## 2021-04-11 NOTE — ED NOTES
Pt pacing hallways grabbing her face. Pt yelling, \"I'm highly anxious, ma'am! You have been pumping me full of drugs. \" Pt escorted back into room and RN explains that she was only given the medications she asked for (seroquel). Pt getting in RN's face and RN stepped back. She stepped forward to RN and said to the officer, \"You want to take me to [facility]?!\" Pt back in room pacing.

## 2021-04-11 NOTE — ED NOTES
Patient is TDO'd. Report called to [facility] RN. Patient will go to [facility] room 518.

## 2021-04-11 NOTE — ED NOTES
This RN went in to ask if pt would do EKG. Pt got upset and said, \"You all are trigging me! \" Pt refused. [facility] in room.

## 2021-04-11 NOTE — ED NOTES
Patient resting quietly on stretcher with [employer] at bedside. When nurse was asking patient  Questions for nursing assessment patient stated that the questions were \"triggering\" her and refused to answer anymore questions and ask nurse to leave her room. Patient refusing to have labs drawn. Lamar Butler with CTC notified.

## 2021-04-11 NOTE — ED PROVIDER NOTES
EMERGENCY DEPARTMENT HISTORY AND PHYSICAL EXAM      Please note that this dictation was completed with the assistance of \"Dragon\", the computer voice recognition software. Quite often unanticipated grammatical, syntax, homophones, and other interpretive errors are inadvertently transcribed by the computer software. Please disregard these errors and any errors that have escaped final proofreading. Thank you. Patient Name: Kristie Peguero  : 1985  MRN: [id number]  History of Presenting Illness     Chief Complaint   Patient presents with   [address]     History Provided By: Patient and law enforcement    HPI: Kristie Peguero, 28 y.o. female with past medical history as documented below presents to the ED with c/o of mental health evaluation. Patient was just recently admitted to the hospital for psychosis, robert and was discharged on Seroquel and trazodone. According to report, pt is under an ECO for bizarre behavior. According to report, patient has been walking into traffic and acting erratically. Patient reports noncompliance to medications including Seroquel and trazodone. Patient is agitated and restless with pressured speech. Pt is stating, Luann Walters is my [employer] mom and she is no to be trusted. \" Pt admits to PTSD history. Pt denies HI or SI, denies hallucinations. Pt denies any other alleviating or exacerbating factors. Additionally, pt specifically denies any recent fever, chills, headache, nausea, vomiting, abdominal pain, CP, SOB, lightheadedness, dizziness, numbness, weakness, lower extremity swelling, heart palpitations, urinary sxs, diarrhea, constipation, melena, hematochezia, cough, or congestion. History limited due to psychiatric condition. There are no other complaints, changes or physical findings pertinent to the HPI at this time.     PCP: Laz Bray MD    Past History   Past Medical History:  Past Medical History:   Diagnosis Date    Asthma      Past Surgical History:  Past Surgical History:   Procedure Laterality Date    ABDOMEN SURGERY PROC UNLISTED      Lap band Oct 2008    HX GASTRIC BYPASS  2016    HX HERNIA REPAIR  2018    incarcerated hernia    HX ROTATOR CUFF REPAIR Right 2017       Family History:  Denies    Social History:  Social History     Tobacco Use    Smoking status: Never Smoker    Smokeless tobacco: Current User   Substance Use Topics    Alcohol use: No    Drug use: Yes     Types: Marijuana     Comment: vape and smoke marijuana       Allergies:  No Known Allergies    Current Medications:  No current facility-administered medications on file prior to encounter. Current Outpatient Medications on File Prior to Encounter   Medication Sig Dispense Refill    QUEtiapine SR (SEROquel XR) 300 mg sr tablet Take 1 Tab by mouth nightly. Indications: bipolar depression 30 Tab 0    multivitamin with iron tablet Take 1 Tab by mouth daily.  cholecalciferol, vitamin D3, (VITAMIN D3 PO) Take  by mouth.  traZODone (DESYREL) 50 mg tablet Take 1 Tab by mouth nightly as needed for Sleep (For insomnia). Indications: insomnia associated with depression 30 Tab 0     Review of Systems   Review of Systems   Constitutional: Negative. Negative for chills and fever. HENT: Negative. Negative for congestion and sore throat. Eyes: Negative. Respiratory: Negative. Negative for cough, chest tightness, shortness of breath and wheezing. Cardiovascular: Negative. Negative for chest pain, palpitations and leg swelling. Gastrointestinal: Negative. Negative for abdominal distention, abdominal pain, blood in stool, constipation, diarrhea, nausea and vomiting. Endocrine: Negative. Genitourinary: Negative. Negative for dysuria, flank pain, frequency, hematuria and urgency. Musculoskeletal: Negative. Negative for arthralgias, back pain and myalgias. Skin: Negative. Negative for color change and rash.    Neurological: Negative. Negative for dizziness, syncope, speech difficulty, weakness, light-headedness, numbness and headaches. Hematological: Negative. Psychiatric/Behavioral: Positive for agitation, behavioral problems, dysphoric mood and sleep disturbance. Negative for confusion and self-injury. The patient is nervous/anxious and is hyperactive. All other systems reviewed and are negative. Physical Exam   Physical Exam  Vitals signs and nursing note reviewed. Constitutional:       General: She is in acute distress. Appearance: She is well-developed. HENT:      Head: Normocephalic and atraumatic. Mouth/Throat:      Pharynx: No oropharyngeal exudate. Eyes:      Conjunctiva/sclera: Conjunctivae normal.   Neck:      Musculoskeletal: Normal range of motion. Cardiovascular:      Rate and Rhythm: Normal rate and regular rhythm. Heart sounds: Normal heart sounds. Pulmonary:      Effort: Pulmonary effort is normal. No respiratory distress. Breath sounds: Normal breath sounds. No wheezing or rales. Chest:      Chest wall: No tenderness. Abdominal:      General: Bowel sounds are normal. There is no distension. Palpations: Abdomen is soft. There is no mass. Tenderness: There is no abdominal tenderness. There is no guarding or rebound. Musculoskeletal: Normal range of motion. Skin:     General: Skin is warm. Neurological:      Mental Status: She is alert and oriented to person, place, and time. Cranial Nerves: No cranial nerve deficit. Motor: No abnormal muscle tone. Psychiatric:         Attention and Perception: She is inattentive. She does not perceive auditory or visual hallucinations. Mood and Affect: Mood is anxious. Affect is labile and inappropriate. Speech: Speech is rapid and pressured. Behavior: Behavior is agitated and hyperactive. Behavior is not slowed, aggressive, withdrawn or combative. Thought Content:  Thought content is paranoid. Thought content is not delusional. Thought content does not include homicidal or suicidal ideation. Thought content does not include homicidal or suicidal plan. Judgment: Judgment is impulsive. Diagnostic Study Results     Labs -   I have personally reviewed and interpreted all available laboratory results. Recent Results (from the past 24 hour(s))   CBC WITH AUTOMATED DIFF    Collection Time: 04/10/21  7:30 PM   Result Value Ref Range    WBC 8.5 3.6 - 11.0 K/uL    RBC 3.73 (L) 3.80 - 5.20 M/uL    HGB 9.9 (L) 11.5 - 16.0 g/dL    HCT 30.1 (L) 35.0 - 47.0 %    MCV 80.7 80.0 - 99.0 FL    MCH 26.5 26.0 - 34.0 PG    MCHC 32.9 30.0 - 36.5 g/dL    RDW 16.4 (H) 11.5 - 14.5 %    PLATELET 427 110 - 595 K/uL    MPV 9.4 8.9 - 12.9 FL    NRBC 0.0 0  WBC    ABSOLUTE NRBC 0.00 0.00 - 0.01 K/uL    NEUTROPHILS 57 32 - 75 %    LYMPHOCYTES 28 12 - 49 %    MONOCYTES 9 5 - 13 %    EOSINOPHILS 4 0 - 7 %    BASOPHILS 1 0 - 1 %    IMMATURE GRANULOCYTES 1 (H) 0.0 - 0.5 %    ABS. NEUTROPHILS 4.8 1.8 - 8.0 K/UL    ABS. LYMPHOCYTES 2.4 0.8 - 3.5 K/UL    ABS. MONOCYTES 0.8 0.0 - 1.0 K/UL    ABS. EOSINOPHILS 0.3 0.0 - 0.4 K/UL    ABS. BASOPHILS 0.1 0.0 - 0.1 K/UL    ABS. IMM. GRANS. 0.1 (H) 0.00 - 0.04 K/UL    DF SMEAR SCANNED      RBC COMMENTS ANISOCYTOSIS  1+       METABOLIC PANEL, COMPREHENSIVE    Collection Time: 04/10/21  7:30 PM   Result Value Ref Range    Sodium 139 136 - 145 mmol/L    Potassium 4.1 3.5 - 5.1 mmol/L    Chloride 105 97 - 108 mmol/L    CO2 25 21 - 32 mmol/L    Anion gap 9 5 - 15 mmol/L    Glucose 112 (H) 65 - 100 mg/dL    BUN 15 6 - 20 MG/DL    Creatinine 0.80 0.55 - 1.02 MG/DL    BUN/Creatinine ratio 19 12 - 20      GFR est AA >60 >60 ml/min/1.73m2    GFR est non-AA >60 >60 ml/min/1.73m2    Calcium 8.7 8.5 - 10.1 MG/DL    Bilirubin, total 0.3 0.2 - 1.0 MG/DL    ALT (SGPT) 23 12 - 78 U/L    AST (SGOT) 24 15 - 37 U/L    Alk.  phosphatase 74 45 - 117 U/L    Protein, total 6.9 6.4 - 8.2 g/dL    Albumin 3.1 (L) 3.5 - 5.0 g/dL    Globulin 3.8 2.0 - 4.0 g/dL    A-G Ratio 0.8 (L) 1.1 - 2.2     URINALYSIS W/ REFLEX CULTURE    Collection Time: 04/10/21  8:06 PM    Specimen: Urine   Result Value Ref Range    Color YELLOW/STRAW      Appearance CLEAR CLEAR      Specific gravity 1.015 1.003 - 1.030      pH (UA) 5.5 5.0 - 8.0      Protein Negative NEG mg/dL    Glucose Negative NEG mg/dL    Ketone TRACE (A) NEG mg/dL    Bilirubin Negative NEG      Blood LARGE (A) NEG      Urobilinogen 0.2 0.2 - 1.0 EU/dL    Nitrites Negative NEG      Leukocyte Esterase TRACE (A) NEG      WBC 0-4 0 - 4 /hpf    RBC 0-5 0 - 5 /hpf    Epithelial cells FEW FEW /lpf    Bacteria Negative NEG /hpf    UA:UC IF INDICATED CULTURE NOT INDICATED BY UA RESULT CNI     DRUG SCREEN, URINE    Collection Time: 04/10/21  8:06 PM   Result Value Ref Range    AMPHETAMINES Negative NEG      BARBITURATES Negative NEG      BENZODIAZEPINES Negative NEG      COCAINE Negative NEG      METHADONE Negative NEG      OPIATES Negative NEG      PCP(PHENCYCLIDINE) Negative NEG      THC (TH-CANNABINOL) Positive (A) NEG      Drug screen comment (NOTE)    COVID-19 WITH INFLUENZA A/B    Collection Time: 04/10/21  8:06 PM   Result Value Ref Range    SARS-CoV-2 Not detected NOTD      Influenza A by PCR Not detected NOTD      Influenza B by PCR Not detected NOTD     HCG URINE, QL. - POC    Collection Time: 04/10/21  8:11 PM   Result Value Ref Range    Pregnancy test,urine (POC) Negative NEG     HCG URINE, QL. - POC    Collection Time: 04/10/21  8:13 PM   Result Value Ref Range    Pregnancy test,urine (POC) Negative NEG     ETHYL ALCOHOL    Collection Time: 04/10/21  9:35 PM   Result Value Ref Range    ALCOHOL(ETHYL),SERUM <10 <10 MG/DL   EKG, 12 LEAD, INITIAL    Collection Time: 04/11/21 12:12 AM   Result Value Ref Range    Ventricular Rate 106 BPM    Atrial Rate 106 BPM    P-R Interval 130 ms    QRS Duration 68 ms    Q-T Interval 354 ms    QTC Calculation (Bezet) 470 ms    Calculated P Axis 52 degrees    Calculated R Axis 11 degrees    Calculated T Axis 35 degrees    Diagnosis       Sinus tachycardia  Nonspecific T wave abnormality  Abnormal ECG  When compared with ECG of 12-MAR-2020 15:03,  Vent. rate has increased BY  37 BPM  T wave inversion more evident in Anterolateral leads  QT has lengthened         Radiologic Studies -   I have personally reviewed and interpreted all available imaging studies and agree with radiology interpretation and report. No orders to display     CT Results  (Last 48 hours)    None        CXR Results  (Last 48 hours)    None          Medical Decision Making   I reviewed the vital signs, available nursing notes, past medical history, past surgical history, family history and social history. Vital Signs-Reviewed the patient's vital signs. Patient Vitals for the past 24 hrs:   Temp Pulse Resp BP SpO2   04/11/21 0212 97.9 °F (36.6 °C) 79 16 101/66 100 %   04/10/21 2059 98 °F (36.7 °C) -- -- -- --   04/10/21 2010 (!) 32 °F (0 °C) -- -- -- --   04/10/21 1852 98.2 °F (36.8 °C) 92 18 118/82 100 %       Pulse Oximetry Analysis - 100% on RA    Cardiac Monitor:   Rate: 92 bpm  The cardiac monitor revealed the following rhythm as interpreted by me: Normal Sinus Rhythm       ED EKG interpretation:  Rhythm: sinus tachycardia; and regular . Rate (approx.): 106; Axis: normal; P wave: normal; QRS interval: normal ; ST/T wave: normal; Other findings: normal. This EKG was interpreted by Beau Scott M.D. Records Reviewed: Nursing Notes, Old Medical Records, Previous electrocardiograms, Previous Radiology Studies and Previous Laboratory Studies    Provider Notes (Medical Decision Making):   Patient presents with bizarre behavior and mood disorder. DDx:  2/2 MDD, schizoaffective d/o, bipolar, drug induced, organic cause such as electrolyte anomoly or infection.  Stable vitals and benign exam. No obvious organic causes to explain behavior but will obtain psych labs, UA, UDS and speak with mental health professional about possible admission. Pt is currently under an ECO. Sitter at bedside. Will continue to monitor while in ED. ED Course:   I am the first provider for this patient's ED visit today. Initial assessment performed. I discussed presenting problems, concerns and my formulated plan for today's visit with the patient and any available family members at bedside. I encouraged them to ask questions as they arise throughout the visit. I reviewed our electronic medical record system for any past medical records that were available that may contribute to the patient's current condition, the nursing notes and vital signs from today's visit. ED Orders Placed :  Orders Placed This Encounter    CBC WITH AUTOMATED DIFF    METABOLIC PANEL, COMPREHENSIVE    BLOOD ALCOHOL (Ethyl Alcohol)    URINALYSIS W/ REFLEX CULTURE    DRUG SCREEN, URINE    COVID-19 WITH INFLUENZA A/B    POC URINE PREGNANCY TEST    HCG URINE, QL. - POC    HCG URINE, QL. - POC    EKG 12 LEAD INITIAL    QUEtiapine SR (SEROquel XR) tablet 300 mg    docusate sodium (COLACE) capsule 100 mg    DISCONTD: haloperidol lactate (HALDOL) injection 5 mg       ED Medications Administered:  Medications   QUEtiapine SR (SEROquel XR) tablet 300 mg (300 mg Oral Given 4/10/21 2246)   docusate sodium (COLACE) capsule 100 mg (100 mg Oral Given 4/10/21 2342)     ED Course as of Apr 11 1703   Sat Apr 10, 2021   1944 Consult Note:  Manpreet Addison MD spoke with Vel Dior,   Specialty: [facility]   Discussed pt's hx, disposition, and available diagnostic and imaging results. Reviewed care plans. Agree with management and plan thus far. Consultant will evaluate pt for admission. [HW]   Sun Apr 11, 2021   0130 Consult Note:  Manpreet Addison MD spoke with Dr. Melissa Verma,   Specialty: Psych  Discussed pt's hx, disposition, and available diagnostic and imaging results. Reviewed care plans.  Agree with management and plan thus far. Consultant will accept patient for transfer. [HW]   0131 Transfer Note:   Patient is being transferred to [facility]. Transfer was accepted by Dr. Maxwell Hall. The reasons for their transfer have been discussed with them and available family. They convey agreement and understanding for the need to be transferred as explained to them by me. Blayne Zhou MD        [HW]      ED Course User Index  [HW] Dominique Flores MD     Progress Note:  Pt is refusing blood work. UDS positive for THC; rapid covid negative. [facility] planning for TDO. Progress Note:  Labs obtained; pt is medically cleared. LAST LOOK:  2:10 AM  Visit Vitals  Visit Vitals  /66 (BP 1 Location: Right upper arm, BP Patient Position: At rest)   Pulse 79   Temp 97.9 °F (36.6 °C)   Resp 16   Wt 97.5 kg (215 lb)   SpO2 100%   BMI 38.09 kg/m²       Just prior to transfer, I re-evaluated the patient. Pertinent physical exam includes stable vitals and unchanged exam and mental status. Vitals reviewed and patient/family given a chance to ask questions. All agree with the plan to transfer. At this time, I feel that the pt is stable for transfer. Blayne Zhou MD    CRITICAL CARE NOTE :  IMPENDING DETERIORATION -CNS  ASSOCIATED RISK FACTORS - CNS Decompensation  MANAGEMENT- Bedside Assessment, Supervision of Care and Transfer  INTERPRETATION -  Blood Pressure and Cardiac Output Measures   INTERVENTIONS - Neurologic interventions   CASE REVIEW - Medical Sub-Specialist, Nursing, Family and Law Enforcement, CRISIS, Psychiatry  TREATMENT RESPONSE -Improved  PERFORMED BY - Self    NOTES   :  I personally spent 35 minutes of critical care time with this patient. This is time spent at this critically ill patient's bedside actively involved in patient care as well as the coordination of care and discussions with the patient's family.  This includes time involved in lab review, consultations with specialist, family decision-making, bedside attention and documentation. During this entire length of time I was immediately available to the patient. This does not include time spent on separately reported billable procedures. Critical Care: The reason for providing this level of medical care for this critically-ill patient was due to a critical illness that impaired one or more vital organ systems, such that there was a high probability of imminent or life-threatening deterioration in the patient's condition. This care involved the highest level of preparedness to intervene urgently. This care involved high complexity decision making to assess, manipulate, and support vital system functions, to treat this degree of vital organ system failure, and to prevent further life threatening deterioration of the patients condition requiring frequent assessments and interventions. Hernesto Corral MD    Disposition:   Transfer Note:   Patient is being transferred to [facility]. Transfer was accepted by Dr. Nisha Perez. The reasons for their transfer have been discussed with them and available family. They convey agreement and understanding for the need to be transferred as explained to them by me. Hernesto Corral MD    Diagnosis   Clinical Impression:  1. Bizarre behavior    2. Acute psychosis ([facility] [address]    3. PTSD (post-traumatic stress disorder)    4. Manic episode ([facility] [address]    5. Cannabis abuse        Attestation:  Nadia Barney MD, am the attending of record for this patient. I personally performed the services described in this documentation on this date, 4/10/2021 for patient, Sheela Oropeza. I have reviewed the chart and verified that the record is accurate and complete.

## 2021-04-11 NOTE — ED NOTES
Joseph Vidal from [address] [place] [address] called and relayed that [facility] psych is looking at patient for admit and is requiring a EKG prior to approval. This relayed to MD and Arianna Merida RN- primary nurse.

## 2021-04-12 LAB
ATRIAL RATE: 106 BPM
CALCULATED P AXIS, ECG09: 52 DEGREES
CALCULATED R AXIS, ECG10: 11 DEGREES
CALCULATED T AXIS, ECG11: 35 DEGREES
DIAGNOSIS, 93000: NORMAL
P-R INTERVAL, ECG05: 130 MS
Q-T INTERVAL, ECG07: 354 MS
QRS DURATION, ECG06: 68 MS
QTC CALCULATION (BEZET), ECG08: 470 MS
VENTRICULAR RATE, ECG03: 106 BPM

## 2022-03-19 PROBLEM — F30.9 MANIA (HCC): Status: ACTIVE | Noted: 2021-04-04

## 2022-03-20 PROBLEM — F29 PSYCHOSIS (HCC): Status: ACTIVE | Noted: 2021-04-02

## 2022-10-25 ENCOUNTER — HOSPITAL ENCOUNTER (INPATIENT)
Age: 37
LOS: 3 days | Discharge: HOME | DRG: 882 | End: 2022-10-28
Attending: EMERGENCY MEDICINE | Admitting: PSYCHIATRY & NEUROLOGY
Payer: COMMERCIAL

## 2022-10-25 DIAGNOSIS — F22 PARANOIA (HCC): Primary | ICD-10-CM

## 2022-10-25 PROBLEM — F29 PSYCHOSIS, UNSPECIFIED PSYCHOSIS TYPE (HCC): Status: ACTIVE | Noted: 2022-10-25

## 2022-10-25 LAB
ALBUMIN SERPL-MCNC: 4 G/DL (ref 3.5–5)
ALBUMIN/GLOB SERPL: 0.9 {RATIO} (ref 1.1–2.2)
ALP SERPL-CCNC: 83 U/L (ref 45–117)
ALT SERPL-CCNC: 64 U/L (ref 12–78)
AMPHET UR QL SCN: NEGATIVE
ANION GAP SERPL CALC-SCNC: 13 MMOL/L (ref 5–15)
APPEARANCE UR: ABNORMAL
AST SERPL-CCNC: 73 U/L (ref 15–37)
BACTERIA URNS QL MICRO: ABNORMAL /HPF
BARBITURATES UR QL SCN: NEGATIVE
BASOPHILS # BLD: 0.1 K/UL (ref 0–0.1)
BASOPHILS NFR BLD: 1 % (ref 0–1)
BENZODIAZ UR QL: NEGATIVE
BILIRUB SERPL-MCNC: 0.5 MG/DL (ref 0.2–1)
BILIRUB UR QL: NEGATIVE
BUN SERPL-MCNC: 14 MG/DL (ref 6–20)
BUN/CREAT SERPL: 17 (ref 12–20)
CALCIUM SERPL-MCNC: 8.9 MG/DL (ref 8.5–10.1)
CANNABINOIDS UR QL SCN: POSITIVE
CHLORIDE SERPL-SCNC: 100 MMOL/L (ref 97–108)
CO2 SERPL-SCNC: 24 MMOL/L (ref 21–32)
COCAINE UR QL SCN: NEGATIVE
COLOR UR: ABNORMAL
CREAT SERPL-MCNC: 0.82 MG/DL (ref 0.55–1.02)
DIFFERENTIAL METHOD BLD: ABNORMAL
DRUG SCRN COMMENT,DRGCM: ABNORMAL
EOSINOPHIL # BLD: 0.1 K/UL (ref 0–0.4)
EOSINOPHIL NFR BLD: 1 % (ref 0–7)
EPITH CASTS URNS QL MICRO: ABNORMAL /LPF
ERYTHROCYTE [DISTWIDTH] IN BLOOD BY AUTOMATED COUNT: 18.3 % (ref 11.5–14.5)
ETHANOL SERPL-MCNC: <10 MG/DL
FLUAV RNA SPEC QL NAA+PROBE: NOT DETECTED
FLUBV RNA SPEC QL NAA+PROBE: NOT DETECTED
GLOBULIN SER CALC-MCNC: 4.3 G/DL (ref 2–4)
GLUCOSE SERPL-MCNC: 80 MG/DL (ref 65–100)
GLUCOSE UR STRIP.AUTO-MCNC: NEGATIVE MG/DL
HCG UR QL: NEGATIVE
HCT VFR BLD AUTO: 37.9 % (ref 35–47)
HGB BLD-MCNC: 12.4 G/DL (ref 11.5–16)
HGB UR QL STRIP: NEGATIVE
IMM GRANULOCYTES # BLD AUTO: 0.1 K/UL (ref 0–0.04)
IMM GRANULOCYTES NFR BLD AUTO: 1 % (ref 0–0.5)
KETONES UR QL STRIP.AUTO: >80 MG/DL
LEUKOCYTE ESTERASE UR QL STRIP.AUTO: NEGATIVE
LYMPHOCYTES # BLD: 2.3 K/UL (ref 0.8–3.5)
LYMPHOCYTES NFR BLD: 15 % (ref 12–49)
MCH RBC QN AUTO: 27 PG (ref 26–34)
MCHC RBC AUTO-ENTMCNC: 32.7 G/DL (ref 30–36.5)
MCV RBC AUTO: 82.4 FL (ref 80–99)
METHADONE UR QL: NEGATIVE
MONOCYTES # BLD: 1.5 K/UL (ref 0–1)
MONOCYTES NFR BLD: 10 % (ref 5–13)
NEUTS SEG # BLD: 11.3 K/UL (ref 1.8–8)
NEUTS SEG NFR BLD: 72 % (ref 32–75)
NITRITE UR QL STRIP.AUTO: NEGATIVE
NRBC # BLD: 0 K/UL (ref 0–0.01)
NRBC BLD-RTO: 0 PER 100 WBC
OPIATES UR QL: NEGATIVE
PCP UR QL: NEGATIVE
PH UR STRIP: 6 [PH] (ref 5–8)
PLATELET # BLD AUTO: 403 K/UL (ref 150–400)
PMV BLD AUTO: 9.3 FL (ref 8.9–12.9)
POTASSIUM SERPL-SCNC: 4.4 MMOL/L (ref 3.5–5.1)
PROT SERPL-MCNC: 8.3 G/DL (ref 6.4–8.2)
PROT UR STRIP-MCNC: 100 MG/DL
RBC # BLD AUTO: 4.6 M/UL (ref 3.8–5.2)
RBC #/AREA URNS HPF: ABNORMAL /HPF (ref 0–5)
SARS-COV-2, COV2: NOT DETECTED
SODIUM SERPL-SCNC: 137 MMOL/L (ref 136–145)
SP GR UR REFRACTOMETRY: 1.02
UA: UC IF INDICATED,UAUC: ABNORMAL
UROBILINOGEN UR QL STRIP.AUTO: 1 EU/DL (ref 0.2–1)
WBC # BLD AUTO: 15.4 K/UL (ref 3.6–11)
WBC URNS QL MICRO: ABNORMAL /HPF (ref 0–4)

## 2022-10-25 PROCEDURE — 85025 COMPLETE CBC W/AUTO DIFF WBC: CPT

## 2022-10-25 PROCEDURE — 80307 DRUG TEST PRSMV CHEM ANLYZR: CPT

## 2022-10-25 PROCEDURE — 65220000003 HC RM SEMIPRIVATE PSYCH

## 2022-10-25 PROCEDURE — 80053 COMPREHEN METABOLIC PANEL: CPT

## 2022-10-25 PROCEDURE — 99285 EMERGENCY DEPT VISIT HI MDM: CPT

## 2022-10-25 PROCEDURE — 82077 ASSAY SPEC XCP UR&BREATH IA: CPT

## 2022-10-25 PROCEDURE — 81025 URINE PREGNANCY TEST: CPT

## 2022-10-25 PROCEDURE — 81001 URINALYSIS AUTO W/SCOPE: CPT

## 2022-10-25 PROCEDURE — 87636 SARSCOV2 & INF A&B AMP PRB: CPT

## 2022-10-25 PROCEDURE — 36415 COLL VENOUS BLD VENIPUNCTURE: CPT

## 2022-10-25 RX ORDER — HYDROXYZINE 25 MG/1
50 TABLET, FILM COATED ORAL
Status: DISCONTINUED | OUTPATIENT
Start: 2022-10-25 | End: 2022-10-28 | Stop reason: HOSPADM

## 2022-10-25 RX ORDER — OLANZAPINE 5 MG/1
5 TABLET ORAL
Status: DISCONTINUED | OUTPATIENT
Start: 2022-10-25 | End: 2022-10-28 | Stop reason: HOSPADM

## 2022-10-25 RX ORDER — ADHESIVE BANDAGE
30 BANDAGE TOPICAL DAILY PRN
Status: DISCONTINUED | OUTPATIENT
Start: 2022-10-25 | End: 2022-10-28 | Stop reason: HOSPADM

## 2022-10-25 RX ORDER — HALOPERIDOL 5 MG/ML
5 INJECTION INTRAMUSCULAR
Status: DISCONTINUED | OUTPATIENT
Start: 2022-10-25 | End: 2022-10-28 | Stop reason: HOSPADM

## 2022-10-25 RX ORDER — OLANZAPINE 5 MG/1
10 TABLET, ORALLY DISINTEGRATING ORAL
Status: ACTIVE | OUTPATIENT
Start: 2022-10-25 | End: 2022-10-26

## 2022-10-25 RX ORDER — DIPHENHYDRAMINE HYDROCHLORIDE 50 MG/ML
50 INJECTION, SOLUTION INTRAMUSCULAR; INTRAVENOUS
Status: DISCONTINUED | OUTPATIENT
Start: 2022-10-25 | End: 2022-10-28 | Stop reason: HOSPADM

## 2022-10-25 RX ORDER — TRAZODONE HYDROCHLORIDE 50 MG/1
50 TABLET ORAL
Status: DISCONTINUED | OUTPATIENT
Start: 2022-10-25 | End: 2022-10-28 | Stop reason: HOSPADM

## 2022-10-25 RX ORDER — ACETAMINOPHEN 325 MG/1
650 TABLET ORAL
Status: DISCONTINUED | OUTPATIENT
Start: 2022-10-25 | End: 2022-10-28 | Stop reason: HOSPADM

## 2022-10-25 RX ORDER — LORAZEPAM 2 MG/ML
1 INJECTION INTRAMUSCULAR
Status: DISCONTINUED | OUTPATIENT
Start: 2022-10-25 | End: 2022-10-28 | Stop reason: HOSPADM

## 2022-10-25 RX ORDER — BENZTROPINE MESYLATE 1 MG/1
1 TABLET ORAL
Status: DISCONTINUED | OUTPATIENT
Start: 2022-10-25 | End: 2022-10-28 | Stop reason: HOSPADM

## 2022-10-25 NOTE — ED NOTES
Pt belonging have been secured and wanded by security. Pt still refusing to change into paper scrubs.

## 2022-10-25 NOTE — ED NOTES
Patient changed into paper scrubs. TRANSFER - OUT REPORT:    Verbal report given to Jose Elias Subramanian RN (name) on Nicole Vega  being transferred to behavioral health [facility]. (unit) for routine progression of care       Report consisted of patients Situation, Background, Assessment and   Recommendations(SBAR). Information from the following report(s) SBAR was reviewed with the receiving nurse. Lines:       Opportunity for questions and clarification was provided.       Patient transported with:   Security  [employer]

## 2022-10-25 NOTE — ED NOTES
1310- attempted to obtain vitals. Patient is hysterical at this time and refusing all care. Patient will not change clothes.
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
## 2022-10-25 NOTE — ED NOTES
Patient refusing vitals at this time. Patient stated she wants to speak with the doctor regarding her lab work. Discussed lab work results with patient. Patient continuing to refuse. MD aware. Held patient medication at this time as she is able to self soothe and meds likely to cause more agitation.  On coming nurse made aware

## 2022-10-25 NOTE — ED NOTES
Bedside and Verbal shift change report given to binh maguire (oncoming nurse) by Stephani Gallardo (offgoing nurse). Report included the following information SBAR, Kardex, ED Summary, and MAR.

## 2022-10-25 NOTE — ED NOTES
Pt brought to the ED under an ECO at 1144 am on 10/25/2022. Patient is agitated, yelling, paranoid, tearful and trying to leave the ED. Unable to redirect patient upon arrival. Patient is suspicious of ED staff and uncooperative. Initially refusing vitals lab work and to change into paper scrubs for safety. Patient speech is pressure with flight of ideas. Patient stated that she does not feel safe at apartment and was in the office trying to break lease when she was\"pushed into and episode of anger\". Patient endorses hx ptsd, denies si/hi/hallucinations/delusions. Pt is alert and oriented x 4, RR even and unlabored, skin is warm and dry. Assessment completed and pt updated on plan of care. Call bell in reach. Emergency Department Nursing Plan of Care       The Nursing Plan of Care is developed from the Nursing assessment and Emergency Department Attending provider initial evaluation. The plan of care may be reviewed in the ED Provider note.     The Plan of Care was developed with the following considerations:   Patient / Family readiness to learn indicated by:verbalized understanding  Persons(s) to be included in education: patient  Barriers to Learning/Limitations:No    Signed     Sita Hicks RN    10/25/2022

## 2022-10-25 NOTE — ED NOTES
Pt cooperative with lab draw.  Pt appeared to have calmed herself down and will only have the occassional outburst. She had not shown any violent behavior, physically or verbally during stay

## 2022-10-25 NOTE — ED NOTES
Kelin Morales a therapist from [place] [address] was calling for an update on patient. She reports she can be contacted at [phone]. Haresh york.

## 2022-10-26 PROBLEM — F43.10 POST TRAUMATIC STRESS DISORDER: Status: ACTIVE | Noted: 2022-10-26

## 2022-10-26 PROCEDURE — 65220000003 HC RM SEMIPRIVATE PSYCH

## 2022-10-26 RX ORDER — LANOLIN ALCOHOL/MO/W.PET/CERES
325 CREAM (GRAM) TOPICAL DAILY
Status: DISCONTINUED | OUTPATIENT
Start: 2022-10-27 | End: 2022-10-27

## 2022-10-26 RX ORDER — LANOLIN ALCOHOL/MO/W.PET/CERES
400 CREAM (GRAM) TOPICAL DAILY
Status: DISCONTINUED | OUTPATIENT
Start: 2022-10-27 | End: 2022-10-27

## 2022-10-26 RX ORDER — MELATONIN
1000 DAILY
Status: DISCONTINUED | OUTPATIENT
Start: 2022-10-27 | End: 2022-10-27

## 2022-10-26 RX ORDER — CALCIUM CARBONATE 200(500)MG
200 TABLET,CHEWABLE ORAL
Status: DISCONTINUED | OUTPATIENT
Start: 2022-10-26 | End: 2022-10-28 | Stop reason: HOSPADM
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
## 2022-10-26 NOTE — PROGRESS NOTES
[facility] Admission Pharmacy Medication Reconciliation     Information obtained from: Patient interview, chart review  RxQuery data available1: Yes (limited)    Comments/recommendations:  Patient denies taking any prescription medications, only reports taking various vitamins. Was irritable & labile during interview, stating that she had previously given her list of vitamins to the \"lady last night\". Explained to patient that the list she provided last night was not transposed into our EHR, provided list of PTA medications to patient to show what's in our EHR. Declined to answer questions regarding frequency or dosing for vitamins below. Medication changes (since last review): Added  Ferrous sulfate  Calcium  Magnesium  Fish oil  \"Maringa\"  Removed  Quetiapine SR  Trazodone     1RxQuery pharmacy benefit data reflects medications filled and processed through the patient's insurance, however this data does NOT capture whether the medication was picked up or is currently being taken by the patient. Patient allergies: Allergies as of 10/25/2022    (No Known Allergies)       Prior to Admission Medications   Prescriptions Last Dose Informant Patient Reported? Taking? CALCIUM CARBONATE PO  Self Yes Yes   FERROUS SULFATE PO  Self Yes Yes   MAGNESIUM PO  Self Yes Yes   OTHER,NON-FORMULARY,  Self Yes Yes   Sig: \"Maringa\"   cholecalciferol, vitamin D3, (VITAMIN D3 PO)  Self Yes Yes   Sig: Indications: supplementation   docosahexaenoic acid/epa (FISH OIL PO)  Self Yes Yes   multivitamin with iron tablet  Self Yes Yes   Sig: Take 1 Tablet by mouth daily.  Indications: treatment to prevent vitamin deficiency      Facility-Administered Medications: None       Thank you,  Terrel Runner, [place], [facility] Specialist, [address] [place] [address] Nw: [phone] ([phone])  Pharmacy: [phone] ([phone])

## 2022-10-26 NOTE — GROUP NOTE
[facility]  GROUP DOCUMENTATION INDIVIDUAL                                                                          Group Therapy Note    Date: 10/26/2022    Group Start Time: 1400  Group End Time: 1500  Group Topic: Recreational/Music Therapy    [address] [place] [address] ACUTE BEHAV [facility]    [facility] GROUP    Group Therapy Note    Attendees: 7       Attendance: Did not attend      Angel Finnegan

## 2022-10-26 NOTE — GROUP NOTE
[facility]  GROUP DOCUMENTATION INDIVIDUAL                                                                          Group Therapy Note    Date: 10/26/2022    Group Start Time: 1000  Group End Time: 1100  Group Topic: Topic Group    [facility] [place] [address] ACUTE BEHAV [facility]    [place], [address] [facility] GROUP DOCUMENTATION GROUP    Group Therapy Note    Attendees: 8       Attendance: Attended    Patient's Goal:  To participate in relaxation activity    Interventions/techniques: Supported-music    Interactions: Disorganized interaction    Mental Status: Manic    Behavior/appearance: Needed prompting    Goals Achieved:  Attended group session-elected to listen to music      Additional Notes:  Hyper verbal,loud,inappropriate laughter-reported needing to rest-left session early    Christopher English

## 2022-10-26 NOTE — PROGRESS NOTES
Patient was an active participant in [facility] in acute behavioral health on October 26, 2022. Documented by:      Gloria Dailey, Staff     To yareli goldberg , call [phone] ([phone]).

## 2022-10-26 NOTE — PROGRESS NOTES
Received care of patient ambulating in hallway. Patient is noted to be paranoid, and extremely guarded. Patient reports multiple triggers. Patient is noted to be currently refusing all psychiatric medication interventions as she states that \"the last time I was here all I did was sleep and that is clearly not ok. \" Patient has been intermittently been rude, hyperverbal, and tangential, particularly with male staff members. Noted speaking to another RN about the circumstances which brought her to her admission. Pt currently denies SI/HI or AVH. Patient noted to be blaming others, paranoid and grandiose in her thinking. Pt presented to TDO hearing and has been involuntarily committed, and then later presenting to nurses station stating she \"was told she could go. \" Re-oriented patient to the TDO process. Monitoring to continue for safety, support and situation.    Problem: Discharge Planning  Goal: *Knowledge of medication management  Outcome: Not Progressing Towards Goal     Problem: Anxiety-Behavioral Health (Adult/Pediatric)  Goal: *STG: Demonstrates decrease in ritualistic behavior  Outcome: Not Progressing Towards Goal     Problem: Anxiety-Behavioral Health (Adult/Pediatric)  Goal: *STG: Seeks staff when feelings of anxiety and fear arise  Outcome: Progressing Towards Goal

## 2022-10-26 NOTE — PROGRESS NOTES
Problem: Falls - Risk of  Goal: *Absence of Falls  Description: Document Brittney Rodriguez Fall Risk and appropriate interventions in the flowsheet.   Outcome: Progressing Towards Goal  Note: Fall Risk Interventions:                                Problem: Anxiety-Behavioral Health (Adult/Pediatric)  Goal: *STG: Remains safe in hospital  Outcome: Progressing Towards Goal  Goal: *STG: Seeks staff when feelings of anxiety and fear arise  Outcome: Progressing Towards Goal

## 2022-10-26 NOTE — BH NOTES
Patient given snack and ambulated to bed. Patient resting in bed. Pt slept approximately 5 hours. No further issues noted at this time.

## 2022-10-26 NOTE — PROGRESS NOTES
Behavioral Services  Medicare Certification Upon Admission    I certify that this patient's inpatient psychiatric hospital admission is medically necessary for:    [x] (1) Treatment which could reasonably be expected to improve this patient's condition,       [x] (2) Or for diagnostic study;     AND     [x](2) The inpatient psychiatric services are provided while the individual is under the care of a physician and are included in the individualized plan of care.     Estimated length of stay/service 5 - 7 days    Plan for post-hospital care per social work    Electronically signed by Yadi March MD on 10/26/2022 at 9:14 AM

## 2022-10-27 PROCEDURE — 65220000003 HC RM SEMIPRIVATE PSYCH

## 2022-10-27 PROCEDURE — 74011250637 HC RX REV CODE- 250/637: Performed by: PSYCHIATRY & NEUROLOGY

## 2022-10-27 PROCEDURE — 74011250637 HC RX REV CODE- 250/637

## 2022-10-27 PROCEDURE — 74011250636 HC RX REV CODE- 250/636

## 2022-10-27 RX ORDER — GUAIFENESIN 100 MG/5ML
100 SOLUTION ORAL
Status: DISCONTINUED | OUTPATIENT
Start: 2022-10-27 | End: 2022-10-28 | Stop reason: HOSPADM

## 2022-10-27 RX ORDER — FERROUS SULFATE 300 MG/5ML
300 LIQUID (ML) ORAL
Status: DISCONTINUED | OUTPATIENT
Start: 2022-10-27 | End: 2022-10-28 | Stop reason: HOSPADM

## 2022-10-27 RX ORDER — GUAIFENESIN 600 MG/1
600 TABLET, EXTENDED RELEASE ORAL EVERY 12 HOURS
Status: DISCONTINUED | OUTPATIENT
Start: 2022-10-27 | End: 2022-10-27

## 2022-10-27 RX ADMIN — HYDROXYZINE HYDROCHLORIDE 50 MG: 25 TABLET, FILM COATED ORAL at 21:11

## 2022-10-27 RX ADMIN — ACETAMINOPHEN 650 MG: 325 TABLET, FILM COATED ORAL at 00:28

## 2022-10-27 RX ADMIN — TRAZODONE HYDROCHLORIDE 50 MG: 50 TABLET ORAL at 21:11

## 2022-10-27 RX ADMIN — Medication 1 LOZENGE: at 05:17

## 2022-10-27 RX ADMIN — Medication 15 ML: at 12:48

## 2022-10-27 RX ADMIN — GUAIFENESIN 600 MG: 600 TABLET ORAL at 10:17

## 2022-10-27 RX ADMIN — Medication 300 MG: at 12:48

## 2022-10-27 RX ADMIN — Medication 1 LOZENGE: at 00:28

## 2022-10-27 RX ADMIN — GUAIFENESIN 600 MG: 600 TABLET ORAL at 00:28

## 2022-10-27 RX ADMIN — HALOPERIDOL LACTATE 5 MG: 5 INJECTION, SOLUTION INTRAMUSCULAR at 16:47

## 2022-10-27 RX ADMIN — DIPHENHYDRAMINE HYDROCHLORIDE 50 MG: 50 INJECTION INTRAMUSCULAR; INTRAVENOUS at 16:48

## 2022-10-27 RX ADMIN — GUAIFENESIN 100 MG: 200 SOLUTION ORAL at 21:10

## 2022-10-27 RX ADMIN — LORAZEPAM 1 MG: 2 INJECTION INTRAMUSCULAR; INTRAVENOUS at 16:47

## 2022-10-27 RX ADMIN — HYDROXYZINE HYDROCHLORIDE 50 MG: 25 TABLET, FILM COATED ORAL at 00:28
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
## 2022-10-27 NOTE — PROGRESS NOTES
Problem: Falls - Risk of  Goal: *Absence of Falls  Description: Document Ellis Denis Fall Risk and appropriate interventions in the flowsheet. Outcome: Progressing Towards Goal  Note: Fall Risk Interventions:                                Problem: Anxiety-Behavioral Health (Adult/Pediatric)  Goal: *STG: Remains safe in hospital  Outcome: Progressing Towards Goal  Goal: *STG: Demonstrates decrease in ritualistic behavior  Outcome: Not Progressing Towards Goal     5143-4074: Assumed care of patient after receiving shift report from outgoing nurse. Patient was out and visible on unit, interacting appropriately with peers and staff. Affect is smiling, mood is labile. Pt cooperative with vitals and assessment. A&O x 4. Independent in ADLs. Insight and concentration somewhat present. Gait is steady. Appetite patterns WNL. Denies AVH/SI/HI/Anxiety/Depression. Pt denies pain. Patient medication and diet compliant. Pt encouraged to continue to participate in care. 0371-2191: Pt awoke complaining of persistent cough, post nasal drip, audible congestion and body aches. Provider contacted, orders obtained for hospitalist consult, chloraseptic lozenge and mucinex. Mucinex, PRN chloraseptic lozenge, atarax, and tylenol given at [address]. No further issues noted at this time. 4613-3814: Pt received PRN chloraseptic lozenge at 0517.    4170-6288: Pt has been observed throughout the night. Total hours slept approximately 6.25. No s/s of distress noted. Patient has remained safe. Hourly rounding performed throughout shift.

## 2022-10-27 NOTE — BH NOTES
Pt received in hallway, was agitated and loudly speaking to staff and demanding specific needs. Reported that she is a post-bariatric surgery patient and requires a specific diet, refused breakfast. Stated she was supposed to speak to a dietician yesterday but has not, requested to speak to the nurse leader and patient advocate. Nurse leader assisted pt to speak to nutritionist on the phone who sent up a separate breakfast tray for her, pt was upset as this tray had no source of protein. Pt also upset that she was not given towel or underwear right away in the morning. C/o cold symptoms (congestion, scratchy throat), ordered scheduled mucinex and PRN cough medicine. Excessively loud and demanding intermittently throughout the day. Hyperverbal and perseverative when talking to staff. Observed yelling in the hallway and talking to herself in the reflective mirror on the ceiling. Labile and irritable throughout shift. Continues to complain about the food and care she is receiving here, unreceptive to staff support or redirection. Continued to escalate throughout shift, threatened violence toward staff and peers. Given PRN Haldol 5mg, Benadryl 50mg and Lorazepam 1mg IM. Pt was compliant with medications but upset and agitated. Will continue to monitor and support.
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
## 2022-10-27 NOTE — GROUP NOTE
[facility]IL[facility]  GROUP DOCUMENTATION INDIVIDUAL                                                                          Group Therapy Note    Date: 10/27/2022    Group Start Time: 1400  Group End Time: 1500  Group Topic: Recreational/Music Therapy    [facility] 3 ACUTE BEHAV [facility]    [place], [address] [facility] GROUP DOCUMENTATION GROUP    Group Therapy Note    Attendees: 5       Attendance: Attended    Patient's Goal:  To concentrate on selected task    Interventions/techniques: Supported-crafts,games,music    Interactions: Interacted appropriately    Mental Status: Calm    Behavior/appearance: Cooperative    Goals Achieved: Able to engage in interactions and Able to listen to others-listened to music        Matheus Hanna

## 2022-10-27 NOTE — GROUP NOTE
LILA  GROUP DOCUMENTATION INDIVIDUAL                                                                          Group Therapy Note    Date: 10/27/2022    Group Start Time: 1000  Group End Time: 1100  Group Topic: Topic Group    [facility] 3 ACUTE BEHAV [facility]    [place], [address] [facility] GROUP DOCUMENTATION GROUP    Group Therapy Note    Attendees: 7       Attendance: Attended    Patient's Goal:  To participate In self esteem booster    Interventions/techniques: Supported-handout-People With Mental Illness Enrich Our Lives    Follows Directions:  Followed directions    Interactions: Interacted appropriately    Mental Status: Calm    Behavior/appearance: Attentive, Cooperative, and Needed prompting    Goals Achieved: Able to engage in interactions, Able to listen to others, Able to self-disclose, Discussed coping, and Discussed self-esteem issues    Anjum Muro home

## 2022-10-27 NOTE — PROGRESS NOTES
Problem: Anxiety-Behavioral Health (Adult/Pediatric)  Goal: *STG: Remains safe in hospital  Outcome: Progressing Towards Goal  Goal: *STG: Seeks staff when feelings of anxiety and fear arise  Outcome: Progressing Towards Goal  Goal: *STG/LTG: Trigger identification  Outcome: Progressing Towards Goal  Goal: *STG/LTG: Complies with medication therapy  Outcome: Progressing Towards Goal

## 2022-10-28 VITALS
OXYGEN SATURATION: 98 % | HEART RATE: 89 BPM | BODY MASS INDEX: 38.09 KG/M2 | WEIGHT: 215 LBS | TEMPERATURE: 97.8 F | RESPIRATION RATE: 18 BRPM | DIASTOLIC BLOOD PRESSURE: 56 MMHG | HEIGHT: 63 IN | SYSTOLIC BLOOD PRESSURE: 95 MMHG

## 2022-10-28 PROCEDURE — 74011250637 HC RX REV CODE- 250/637: Performed by: PSYCHIATRY & NEUROLOGY

## 2022-10-28 PROCEDURE — 74011250637 HC RX REV CODE- 250/637

## 2022-10-28 RX ADMIN — Medication 1 LOZENGE: at 12:44

## 2022-10-28 RX ADMIN — Medication 15 ML: at 08:20

## 2022-10-28 RX ADMIN — GUAIFENESIN 100 MG: 200 SOLUTION ORAL at 12:44

## 2022-10-28 RX ADMIN — Medication 300 MG: at 08:20
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
## 2022-10-28 NOTE — PROGRESS NOTES
Problem: Falls - Risk of  Goal: *Absence of Falls  Description: Document Alice Carla Fall Risk and appropriate interventions in the flowsheet. Outcome: Progressing Towards Goal  Note: Fall Risk Interventions:            Medication Interventions: Teach patient to arise slowly                   Problem: Anxiety-Behavioral Health (Adult/Pediatric)  Goal: *STG: Remains safe in hospital  Outcome: Progressing Towards Goal       Patient alert, guarded, labile, paranoid, cooperative. Denies SI/HI. Denies pain. Denies AVH. Denies anxiety and depression. Medication and meal compliant. Visible in the community. No distress observed. Purposeful interaction ongoing.

## 2022-10-28 NOTE — PROGRESS NOTES
Problem: Discharge Planning  Goal: *Discharge to safe environment  Outcome: Resolved/Met  Goal: *Knowledge of medication management  Outcome: Resolved/Met  Goal: *Knowledge of discharge instructions  Outcome: Resolved/Met     Problem: Patient Education: Go to Patient Education Activity  Goal: Patient/Family Education  Outcome: Resolved/Met     Problem: Falls - Risk of  Goal: *Absence of Falls  Description: Document Navneet Fall Risk and appropriate interventions in the flowsheet.   10/28/2022 1346 by Aaliyah Lr RN  Outcome: Resolved/Met  10/28/2022 0942 by Aaliyah Lr RN  Outcome: Progressing Towards Goal  Note: Fall Risk Interventions:            Medication Interventions: Teach patient to arise slowly                   Problem: Patient Education: Go to Patient Education Activity  Goal: Patient/Family Education  Outcome: Resolved/Met     Problem: Anxiety-Behavioral Health (Adult/Pediatric)  Goal: *STG: Participates in treatment plan  Outcome: Resolved/Met  Goal: *STG: Remains safe in hospital  10/28/2022 1346 by Aaliyah Lr RN  Outcome: Resolved/Met  10/28/2022 0942 by Aaliyah Lr RN  Outcome: Progressing Towards Goal  Goal: *STG: Seeks staff when feelings of anxiety and fear arise  Outcome: Resolved/Met  Goal: *STG: Attends activities and groups  Outcome: Resolved/Met  Goal: *STG: Demonstrates decrease in ritualistic behavior  Outcome: Resolved/Met  Goal: *STG: Demonstrates effective anxiety reduction strategies  Outcome: Resolved/Met  Goal: *STG/LTG: Trigger identification  Outcome: Resolved/Met  Goal: *STG/LTG: Complies with medication therapy  Outcome: Resolved/Met  Goal: *LTG:  Verbalizes understanding of personal adaptive level of functioning  Outcome: Resolved/Met  Goal: Interventions  Outcome: Resolved/Met     Problem: Patient Education: Go to Patient Education Activity  Goal: Patient/Family Education  Outcome: Resolved/Met     Problem: Altered Thought Process (Adult/Pediatric)  Goal: *STG: Participates in treatment plan  Outcome: Resolved/Met  Goal: *STG: Remains safe in hospital  Outcome: Resolved/Met  Goal: *STG: Seeks staff when feelings of anxiety and fear arise  Outcome: Resolved/Met  Goal: *STG: Complies with medication therapy  Outcome: Resolved/Met  Goal: *STG: Attends activities and groups  Outcome: Resolved/Met  Goal: *STG: Decreased delusional thinking  Outcome: Resolved/Met  Goal: *STG: Decreased hallucinations  Outcome: Resolved/Met  Goal: *STG: Absence of lethality  Outcome: Resolved/Met  Goal: *STG: Demonstrates ability to understand and use improved judgment in daily activities and relationships  Outcome: Resolved/Met  Goal: *LTG: Returns to baseline functioning  Outcome: Resolved/Met

## 2022-10-28 NOTE — BH NOTES
GROUP THERAPY PROGRESS NOTE    Haseeb Umaña is participating in Psychiatric. Group time: 35 minutes 11:00am-11:35am     Goal orientation:  Psycho -Education     Group therapy participation: active     Therapeutic interventions reviewed and discussed: Group faciltator discussed the Cognitive Behavioral Model this morning. The purpose of group was to help participants gain insights about situation, thoughts, emotions, and behaviors. Facilitator dicussed in detail how thoughts impact feelings and behaviors. Group members were provided two educational handouts to review at their own leisure as well. Impression of participation: Patient was able to serve as an active participant in group by practicing active listening techniques. Patient continues to show progress towards goals by attending groups on a regular basis.          JARRET Medina

## 2022-10-28 NOTE — BH NOTES
Patient alert and verbal. Discharged to home/self to continue recommended plan of care. Discharge instructions reviewed with the patient. Patient verbalized understanding. Patient belongings and valuables returned. Patient transported via [employer] [employer].

## 2022-10-28 NOTE — DISCHARGE INSTRUCTIONS
DISCHARGE SUMMARY    NAME:Jamey Epstein  : 1985  MRN: [id number]    The patient Jadine Nicely exhibits the ability to control behavior in a less restrictive environment. Patient's level of functioning is improving. No assaultive/destructive behavior has been observed for the past 24 hours. No suicidal/homicidal threat or behavior has been observed for the past 24 hours. There is no evidence of serious medication side effects. Patient has not been in physical or protective restraints for at least the past 24 hours. If weapons involved, how are they secured? Pt does not have access to any weapons. Is patient aware of and in agreement with discharge plan? Yes    Arrangements for medication:  No medications were prescribed. Copy of discharge instructions to provider?:  Yes to Shivani Lopez for transportation home:  Pt will take a LYFT home. Keep all follow up appointments as scheduled, continue to take prescribed medications per physician instructions. Mental health crisis number:  530 or your local mental health crisis line number at [address] [place] [address] Emergency WARM LINE      1-859-856-MHAV ()      M-F: 9am to 9pm      Sat & Sun: 5pm - 9pm  National suicide prevention lines:                             6-862-IHHUFZM (7-467-760-102-136-2136)       7-620-003-TALK ([phone])    Crisis Text Line:  Text HOME to 151433       . Jose Keen If I feel I am at risk of hurting myself or others, I will call the crisis office and speak with a crisis worker who will assist me during my crisis. Jose Keen .Gunner Montemayor [address] [phone]  [address] [place], [place] [address] [place] [address]  [facility] Crisis-  [phone]

## 2022-10-28 NOTE — GROUP NOTE
[facility]  GROUP DOCUMENTATION INDIVIDUAL                                                                          Group Therapy Note    Date: 10/28/2022    Group Start Time: 0900  Group End Time: 1000  Group Topic: Topic Group    [address] 3 ACUTE BEHAV [facility]    Dick F[facility] GROUP    Group Therapy Note    Attendees: 5       Attendance: Did not attend    Christopher Jade

## 2022-10-28 NOTE — BH NOTES
Assumed care of the patient. Patient was isolative to her room. Over all she appeared apathetic however was cooperative with the assessments with minimal interaction. Patient denied S.I/H. I/A/V/H and said \"Yes and no \" when asked about depression. Patient seemed to have eaten some of her dinner. PRN Atarax for anxiety,Trazodone for sleep and Robitussin for cough administered upon request.     Patient appeared to be sleeping for about 8 hours.
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
## 2023-02-23 ENCOUNTER — HOSPITAL ENCOUNTER (EMERGENCY)
Age: 38
Discharge: HOME | End: 2023-02-23
Attending: EMERGENCY MEDICINE
Payer: COMMERCIAL

## 2023-02-23 VITALS
SYSTOLIC BLOOD PRESSURE: 100 MMHG | HEART RATE: 94 BPM | DIASTOLIC BLOOD PRESSURE: 66 MMHG | OXYGEN SATURATION: 100 % | TEMPERATURE: 98 F | RESPIRATION RATE: 16 BRPM
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
## 2023-02-23 DIAGNOSIS — F22 DELUSIONAL DISORDER (HCC): ICD-10-CM

## 2023-02-23 PROCEDURE — 99283 EMERGENCY DEPT VISIT LOW MDM: CPT

## 2023-02-23 PROCEDURE — 87070 CULTURE OTHR SPECIMN AEROBIC: CPT

## 2023-02-23 PROCEDURE — 87110 CHLAMYDIA CULTURE: CPT

## 2023-02-23 PROCEDURE — 99284 EMERGENCY DEPT VISIT MOD MDM: CPT

## 2023-02-23 PROCEDURE — 87491 CHLMYD TRACH DNA AMP PROBE: CPT
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
## 2023-02-24 ENCOUNTER — HOSPITAL ENCOUNTER (EMERGENCY)
Age: 38
Discharge: HOME | End: 2023-02-24
Attending: STUDENT IN AN ORGANIZED HEALTH CARE EDUCATION/TRAINING PROGRAM
Payer: COMMERCIAL

## 2023-02-24 VITALS
HEART RATE: 80 BPM | RESPIRATION RATE: 18 BRPM | BODY MASS INDEX: 34.55 KG/M2 | TEMPERATURE: 98.4 F | OXYGEN SATURATION: 98 % | SYSTOLIC BLOOD PRESSURE: 102 MMHG | HEIGHT: 63 IN | DIASTOLIC BLOOD PRESSURE: 60 MMHG | WEIGHT: 195 LBS

## 2023-02-24 DIAGNOSIS — Z59.00 HOMELESSNESS: ICD-10-CM

## 2023-02-24 DIAGNOSIS — Z73.9 DIFFICULTY WITH LIFE MANAGEMENT: Primary | ICD-10-CM

## 2023-02-24 PROCEDURE — 90791 PSYCH DIAGNOSTIC EVALUATION: CPT

## 2023-02-24 PROCEDURE — 99282 EMERGENCY DEPT VISIT SF MDM: CPT

## 2023-02-24 SDOH — ECONOMIC STABILITY - HOUSING INSECURITY: HOMELESSNESS UNSPECIFIED: Z59.00
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
## 2023-02-24 NOTE — ED NOTES
Attempted to review discharge instructions. Pt stated this RN was \"blocking her blessings\". Pt requested disposable underwear. They were provided to patient and pt was shown to the restroom. Pt then escorted by security off of the property as she had been visualized on the security camera wandering the hospital prior to ED visit.
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
## 2023-02-26 LAB
BACTERIA SPEC CULT: NORMAL
SERVICE CMNT-IMP: NORMAL

## 2023-09-08 NOTE — BH NOTES
"Clinic Care Coordination Contact  Community Health Worker Follow Up    Reason: CCC CHW Follow Up Called (follow up current goal's)    Care Gaps:   Health Maintenance Due   Topic Date Due    HF ACTION PLAN  Never done    DIABETIC FOOT EXAM  Never done    COLORECTAL CANCER SCREENING  Never done    ZOSTER IMMUNIZATION (1 of 2) Never done    EYE EXAM  12/21/2022    INFLUENZA VACCINE (1) 09/01/2023    A1C  09/23/2023    PHQ-9  09/23/2023     Pt is aware of due care gaps. Pt agreed that she will review/discuss this with Dr. Gaspar at next office follow up appt schedule on 10/27/2023.     Care Plan:   Care Plan: Financial Wellbeing       Problem: Patient expresses financial resource strain       Goal: I would like to review medical bills and get understanding of coverage       Start Date: 3/28/2023    This Visit's Progress: 50% Recent Progress: 50%    Note:     Barriers: coverage  Strengths: engagement  Patient expressed understanding of goal: yes  Action steps to achieve this goal:  1. I will bring in a copy of most recent medical bills for CCC team to review and get better understanding of Patient responsibility and coverage if received. (Completed)  2. I will speak with  CC to review and get better understanding. (Updated: 9/08/2023)-continues  3. I will continues to updates status with Care coordination team during next outreach. (Updated: 9/08/2023)-continues  4. I will updates status with CHW/CCC team after my son wedding. (Completed; 8/7/2023)  5. I will wait to hear from [facility] dept regards to \"[facility] Care Application\" dropped of to FRW. (FYI: CHW encounter dated 7/07/2023; FRW encounter dated 8/22/2023). (Updated: 9/08/2023)-continues                            Care Plan: Medical       Problem: HP GENERAL PROBLEM       Goal: I would like to have a plan of care for Cardiology health within 3-4 months       Start Date: 3/28/2023    This Visit's Progress: 30% Recent Progress: 30%    Note:  " Called to verify that pharmacy has received prescriptions sent by Dr Alexander Garza and ada paperwork. Pharmacy is in possession of both and will get medication filled as soon as possible and will call to let staff know when ready.     Quinn Aldridge LPC [facility]    Barriers: language  Strengths: family   Patient expressed understanding of goal: yes    Action steps to achieve this goal:  1. I will schedule and attend visit with cardiology provider - number to call [phone].  Needs to schedule with Dr. Roy [facility] for July and [facility] in Oct.  2. I will update Care coordination team during next outreach. (Updated: 9/08/2023)  3. I will report to my Community Health Worker if any additional resources or support needed.  4. I will updates status with CHW/CCC team after my son wedding. (Completed)  5. I will attend appt schedule on 9- at 11:00AM with CCC RN via phone visit to seek further support on goal progression. (Updated: 9/08/2023)  6. I will attend appt on 10/27/2023 with Dr. Gaspar in [facility] to seek further advised. (Updated: 9/08/2023)                  Goal: I would like to attend Annual wellness exam       Expected End Date: 6/23/2023    This Visit's Progress: 30% Recent Progress: 10%    Note:       Patient expressed understanding of goal: yes    Action steps to achieve this goal:  1. I will schedule my annual wellness visit; [phone] ([phone]) scheduled for 6/23  2. I will attend my annual wellness visit.  3. I will contact my Care Management or clinic team if I have barriers to attending my annual wellness visit.   4. I will updates status with CCC team after my son wedding. (Updated: 9/08/2023)  5. I will attend appt on 10/27/2023 with Dr. Gaspar in [facility] to seek further support towards goal completion. (Updated: 9/08/2023)                Goal: I will fill all medications and follow recommendations for theapy and dosing plan.       This Visit's Progress: 50% Recent Progress: 50%    Note:     I will updates status with CCC team after my son wedding. (Completed)  I will attend appt today, 6/28/2023 at 2:00PM with MOODY Morris via video. (Completed)  I will attend appt schedule on 9- at 11:00AM with CCC RN via phone visit to seek  "further advised in regards to goal progression. (Updated: 9/08/2023)  I will attend appt on 10/03/2023 with Solo in [facility] to fill all my medication and seek further support towards goal completion. (Updated: 9/08/2023)  I will attend appt on 10/27/2023 with Dr. Gaspar in [facility] to seek further support towards goal completion. (Updated: 9/08/2023)                            Patient chart reviewed;  -Pt chart reviewed completed by [facility] RN on 9- per notes reviewed.  -Per CCC FRW encounter dated 8- notes reviewed;   \"Program: [facility]  FRW Outreach:   8/22/23:FRW checked billing notes, no CC scanned still. FRW re-emailed application to [employer] and attached Ana Gardner.\"    Patient Outreach Discussion:   [facility] CHW was able to connect with patient today. Patient confirmed that she is available to talked and rush the conversation with CHW. CHW relayed FRW notes dated 8/22/2023 \"re-emailed application to [employer]\" with patient.     CHW suggested pt to schedule an follow up appt with CCC RN to discuss goals progression. Patient confirmed. Pt appt schedule on 9- at 11:00AM with CCC RN via phone visit. Updated current goals.     Patient shared:  -[facility] paperwork: hearing no updates via phone call or mail. Will check my mailbox see if receive anything and will updates status with CCC.   -Doing well. No other questions or concerns.     Pt is encouraged to attend appt date's below:   Name: Leora Sanchez MRN: [id number]     Date: 10/3/2023 Status: Scheduled     Time: 9:00 AM Length: 60     Visit Type: RETURN - MTM [5644] Copay: $0.00     Provider: Art Thakkar PharmD Department: [facility]       Notes: DIabetes follow-up     Name: Leora Sanchez MRN: [id number]     Date: 10/27/2023 Status: Scheduled     Time: 1:00 PM Length: 20     Visit Type: OFFICE VISIT [2421] Copay: $0.00     Provider: Jaky Gaspar MD Department: [facility] FAMILY MEDICINE/OB       Notes: DM f/u; discuss due care gaps completion per pt " agreed 8/7/23     CHW suggested patient for the following:  -Pt connect CCC/CHW with any additional resources need and PCP office for scheduling appt.    CHW Next Follow-up: Goals follow up. Informed patient of due care gaps (if any).     Outreach frequency: monthly      CHW Plan:   -Pt attend appt on 9/13/2023 with CCC RN, 10/03/2023 with Solo, and 10/27/2023 with Dr. Gaspar/PCP as schedule.   -Next CHW outreach plan: 1 month.